# Patient Record
Sex: FEMALE | Race: WHITE | NOT HISPANIC OR LATINO | Employment: FULL TIME | URBAN - NONMETROPOLITAN AREA
[De-identification: names, ages, dates, MRNs, and addresses within clinical notes are randomized per-mention and may not be internally consistent; named-entity substitution may affect disease eponyms.]

---

## 2018-10-15 ENCOUNTER — THERAPY VISIT (OUTPATIENT)
Dept: CHIROPRACTIC MEDICINE | Facility: OTHER | Age: 33
End: 2018-10-15
Attending: CHIROPRACTOR
Payer: COMMERCIAL

## 2018-10-15 DIAGNOSIS — M99.03 SEGMENTAL AND SOMATIC DYSFUNCTION OF LUMBAR REGION: ICD-10-CM

## 2018-10-15 DIAGNOSIS — M99.02 SEGMENTAL AND SOMATIC DYSFUNCTION OF THORACIC REGION: ICD-10-CM

## 2018-10-15 DIAGNOSIS — M99.01 SEGMENTAL AND SOMATIC DYSFUNCTION OF CERVICAL REGION: Primary | ICD-10-CM

## 2018-10-15 DIAGNOSIS — M54.50 LUMBAGO: ICD-10-CM

## 2018-10-15 DIAGNOSIS — M54.2 DORSALGIA OF CERVICAL REGION: ICD-10-CM

## 2018-10-15 PROCEDURE — 99202 OFFICE O/P NEW SF 15 MIN: CPT | Mod: 25 | Performed by: CHIROPRACTOR

## 2018-10-15 PROCEDURE — 98941 CHIROPRACT MANJ 3-4 REGIONS: CPT | Mod: AT | Performed by: CHIROPRACTOR

## 2018-10-15 RX ORDER — ETONOGESTREL AND ETHINYL ESTRADIOL VAGINAL RING .015; .12 MG/D; MG/D
1 RING VAGINAL
COMMUNITY

## 2018-10-15 RX ORDER — DEXTROAMPHETAMINE SACCHARATE, AMPHETAMINE ASPARTATE MONOHYDRATE, DEXTROAMPHETAMINE SULFATE AND AMPHETAMINE SULFATE 2.5; 2.5; 2.5; 2.5 MG/1; MG/1; MG/1; MG/1
10 CAPSULE, EXTENDED RELEASE ORAL 3 TIMES DAILY
COMMUNITY

## 2018-10-15 NOTE — MR AVS SNAPSHOT
After Visit Summary   10/15/2018    Jay Jay Jacobsen    MRN: [id number]           Patient Information     Date Of Birth          1985        Visit Information        Provider Department      10/15/2018 8:00 AM Matt Retana DC [facility]        Today's Diagnoses     Segmental and somatic dysfunction of cervical region    -  1    Segmental and somatic dysfunction of thoracic region        Segmental and somatic dysfunction of lumbar region        Dorsalgia of cervical region        Lumbago          Care Instructions    Continue to use heat as needed. Use stretches and soft tissue mobilization as instructed.          Follow-ups after your visit        Follow-up notes from your care team     Return if symptoms worsen or fail to improve, for Routine Visit.      Who to contact     If you have questions or need follow up information about today's clinic visit or your schedule please contact [facility] directly at [phone].  Normal or non-critical lab and imaging results will be communicated to you by MyChart, letter or phone within 4 business days after the clinic has received the results. If you do not hear from us within 7 days, please contact the clinic through MyChart or phone. If you have a critical or abnormal lab result, we will notify you by phone as soon as possible.  Submit refill requests through [employer] or call your pharmacy and they will forward the refill request to us. Please allow 3 business days for your refill to be completed.          Additional Information About Your Visit        Care EveryWhere ID     This is your Care EveryWhere ID. This could be used by other organizations to access your [facility] medical records  [id number]         Blood Pressure from Last 3 Encounters:   No data found for BP    Weight from Last 3 Encounters:   No data found for Wt              We Performed the Following     CHIROPRAC MANIP,SPINAL,3-4 REGIONS         Primary Care Provider Office Phone # Fax #    Gisell Polanco, EBEN [phone]       [facility] [address]  [facility] [id number]        Equal Access to Services     JAIME GASTELUM : Hadii aad ku hadfabiano Solinseyali, wakenyada luqadaha, qaybta kaalmada adefernie, kimmie parkerin hayaaismael katzdat garciaalfonzo zavaleta. So [facility] [phone].    ATENCIÓN: Si habla español, tiene a stark disposición servicios gratuitos de asistencia lingüística. Llame al [phone].    We comply with applicable federal civil rights laws and [place] laws. We do not discriminate on the basis of race, color, national origin, age, disability, sex, sexual orientation, or gender identity.            Thank you!     Thank you for choosing [facility]  for your care. Our goal is always to provide you with excellent care. Hearing back from our patients is one way we can continue to improve our services. Please take a few minutes to complete the written survey that you may receive in the mail after your visit with us. Thank you!             Your Updated Medication List - Protect others around you: Learn how to safely use, store and throw away your medicines at www.disposemymeds.org.          This list is accurate as of 10/15/18 10:05 AM.  Always use your most recent med list.                   Brand Name Dispense Instructions for use Diagnosis    amphetamine-dextroamphetamine 10 MG per 24 hr capsule    ADDERALL XR     Take 10 mg by mouth 3 times daily        etonogestrel-ethinyl estradiol 0.12-0.015 MG/24HR vaginal ring    NUVARING     Place 1 each vaginally every 28 days

## 2018-10-15 NOTE — PROGRESS NOTES
"PATIENT:  Jay Jay Jacobsen is a 33 year old female presenting for neck/upper back pain and low back pain.    PROBLEM:   Date of Initial Visit for this Episode:  10/15/2018     Visit #1    SUBJECTIVE / HPI:   Description and onset:Patient presents with primary complaints of neck/upper back pain following running the [place] marathon recently. Patient describes pain as soreness. Patient denies any radiation of symptoms or recent traumas to the neck or upper back. Patient was evaluated by Dr. Phu Stephen D.C. In [place] recently and reported the adjustments \"didnt take,\" due to on going symptoms patient presents to our office for further evaluation and treatment.  Duration and Frequency of Pain: a little over a week and Occasionally 26-50%  Radiation of pain: no  Pain rated at it's worst: 3/10  Pain rated currently:  3/10  Pain course: Not changing  Worse with:  Static positioning  Improved by:  Ice, Heat and foam rolling and scapular stabilization exercises  Additional Features: none  Other Health Care Providers seen for this: Dr. Phu Stephen D.C.  Previous treatment: Chiropractic  Previous injury:Patient has been treated for similar complaints in the past.      Other Secondary/Associated Problems  Description, Duration and Location of Seondary Problem: Low back pain after [place] the [place]. Pain is described as sharp. No traumas are reported to the low back.    Duration and Frequency of Pain: A little over a week and 26-50%  Radiation of pain: No  Pain rated at it's worst: 3/10  Pain rated currently:  3/10  Pain course: unchanged  Worse with:  Static positioning  Improved by:  Heat and foam rolling  Additional Features: None  Other Health Care Providers seen for this: Dr. Phu Stephen D.C.  Previous treatment: Chiropractic  Previous injury:Patient has been treated before for similar complaints    See flowsheets in chart for details.  10/15/2018  Neck index 8%    Oswestry Back index 6%      Functional " limitations:  Static positioning for extended periods of time    Exercise habits: Pain is noticed but does not limit  Sleeping habits: unaffected    Past D.C. Care: yes, helpful       Health History as reported by the patient: Excellent and Good      PAST MEDICAL HISTORY:  Past Medical History:   Diagnosis Date     Attention deficit disorder with hyperactivity     No Comments Provided       PAST SURGICAL HISTORY:  Past Surgical History:   Procedure Laterality Date     MAMMOPLASTY AUGMENTATION      Augmentation mammaplasy w/ prosthesis       ALLERGIES:  Allergies not on file    CURRENT MEDICATIONS:  Current Outpatient Prescriptions   Medication Sig Dispense Refill     amphetamine-dextroamphetamine (ADDERALL XR) 10 MG per 24 hr capsule Take 10 mg by mouth 3 times daily       etonogestrel-ethinyl estradiol (NUVARING) 0.12-0.015 MG/24HR vaginal ring Place 1 each vaginally every 28 days         SOCIAL HISTORY:  Marital Status: .  Children: Unknown  Occupation: Works at [email or web address].  Alcohol use:none.  Tobacco use: Smoker: no.      FAMILY HISTORY:  Family History   Problem Relation Age of Onset     Other - See Comments Brother      Psychiatric illness       There is no problem list on file for this patient.        ROS:  The patient denies any fevers, chills, nausea, vomiting, diarrhea, constipation,dysuria, hematuria, or urinary hesitancy or incontinence.  No shortness of breath, chest pain, or rashes.    OBJECTIVE:    DIAGNOSTICS:  NO current spinal imaging taken.     PHYSICAL EXAM:     GENERAL APPEARANCE: healthy, alert, active, no distress, cooperative and smiling   GAIT: NORMAL    MUSCULOSKELETAL:   Posture: Good to fair.  There is moderate anterior head carriage with bilateral rounding of the shoulders mildly.  Right shoulder appears elevated compared to left as is the right iliac crest.  No posterior rotation of the head, shoulders, or pelvis is noted.  Gait:  unremarkable.     Cervical  ROM:   smooth/halting arc  "of motion   50/50 flexion    45/45 extension    45/45 RLF  45/45 LLF    85/85 RR         85/85 LR       -Maximal Foraminal Compression: no focal mild neck pain.    Shoulder Depression: no pain or stretching reported  -Distraction improves      Thoracic and Lumbar  ROM:  60/60 flexion 50/60 extension    35/45 RLF    45/45 LLF       +Kemps: bilaterally of the lumbar spine and over the mid thoracic spine  -Gillets:  - Straight leg raise  - ANA: No sacroiliac jt pain, no restricted ROM.   +Leg length inequality: R 1/4\" [place] -;   Other:  -Ely's. -Nachlas    +Tenderness: Palpatory tenderness is noted of the right lower cervical spine between C4 through C6, point tenderness is noted at T7 midline, operatory tenderness is elicited of L5 on the left  +Muscle spasm: Mild/moderate spasms are noted of the right cervical paraspinals of the lower cervical spine.  Mild spasm noted of the left quadratus lumborum.  There are taut tender fibers of the T-spine paraspinals bilaterally between T5 through T8 and of the lumbar paraspinals on the left.  Taut tender fibers are noted of the gluteus medius bilaterally however left is predominant and of the TFL bilaterally.  +Joint asymmetry and restriction: C5 with extension right lateral flexion and left rotation.  T7 with extension, L5 with extension and right rotation.    ASSESSMENT: Jay Jay Jacobsen is a 33 year old female with neck/upper back pain and low back pain. Patient shows subjective and objective signs of segmental and somatic dysfunction of the cervical, thoracic and lumbar spine. Patient is believed to be a good candidate for chiropractic adjustments with home exercises. Patient is very active in the [employer] and has good body awareness and good knowledge of exercises to be used effectively. Periodic adjustments will also be beneficial when home therapies are not improving patient's symptoms.     1. Segmental and somatic dysfunction of cervical region    2. " Segmental and somatic dysfunction of thoracic region    3. Segmental and somatic dysfunction of lumbar region    4. Dorsalgia of cervical region    5. Lumbago        PLAN    Evaluation and Management:  95186 Low to moderate level exam 20 min    Procedures:  Modalities:  None performed this visit    CMT:  08213 Chiropractic manipulative treatment 3-4 regions performed   Cervical: Diversified, C5 , Supine  Thoracic: Diversified, T7, Prone  Lumbar: Diversified, L5, Side posture    Therapeutic procedures:  The following were demonstrated and practiced: Psoas stretches and soft tissue mobilization. TFL soft tissue mobilization and gluteus medius soft tissue mobilization.    Response to Treatment  Reduction in symptoms as reported by patient    Prognosis: Good    10/15/2018 Plan of Care:  1-4 visits of Chiropractic Care including Spinal Adjustments and/or physiotherapy and active rehabilitation, to include exercises in the office and/or at home to meet care plan goals.     Frequency: As needed for up to 4 weeks. A reevaluation would be clinically appropriate in 4 visits, to determine progress and further course of care. Plan not to exceed 30 days.    POC discussed and patient agreeable to plan of care.      10/15/2018 Goals:      Patient will report improved pain of the neck/upper back.   Patient will report less pain in the low back     Patient will demonstrate effective use of home exercises      INSTRUCTIONS   Continue to use heat as needed. Use stretches and soft tissue mobilization as instructed.    Follow-up:  Continue treatment PRN.        Disclaimer: This note consists of symbols derived from keyboarding, dictation and/or voice recognition software. As a result, there may be errors in the script that have gone undetected. Please consider this when interpreting information found in this chart.

## 2018-11-27 ENCOUNTER — HEALTH MAINTENANCE LETTER (OUTPATIENT)
Age: 33
End: 2018-11-27

## 2018-12-27 ENCOUNTER — THERAPY VISIT (OUTPATIENT)
Dept: CHIROPRACTIC MEDICINE | Facility: OTHER | Age: 33
End: 2018-12-27
Attending: CHIROPRACTOR
Payer: COMMERCIAL

## 2018-12-27 DIAGNOSIS — M54.2 DORSALGIA OF CERVICAL REGION: ICD-10-CM

## 2018-12-27 DIAGNOSIS — M99.02 SEGMENTAL AND SOMATIC DYSFUNCTION OF THORACIC REGION: ICD-10-CM

## 2018-12-27 DIAGNOSIS — M99.01 SEGMENTAL AND SOMATIC DYSFUNCTION OF CERVICAL REGION: Primary | ICD-10-CM

## 2018-12-27 PROCEDURE — 98940 CHIROPRACT MANJ 1-2 REGIONS: CPT | Mod: AT | Performed by: CHIROPRACTOR

## 2018-12-27 NOTE — PROGRESS NOTES
Visit #:  2    Subjective:  Jay Jay Jacobsen is a 33 year old female who is seen in f/u up for:        Segmental and somatic dysfunction of cervical region  Segmental and somatic dysfunction of thoracic region  Dorsalgia of cervical region.     Since last visit on 10/15/2018,  Jay Jay Jacobsen reports:    Area of chief complaint:  Patient presents with primary complaints of neck pain.  Currently pain is ranked at 2 out of 10 on a pain scale.  Patient qualifies her symptoms as tight feeling.  Patient reports that she has begun experiencing occasional headaches.  To help manage her symptoms patient has been performing home stretches and soft tissue mobilization as well as utilizing heat.  Patient states that when this no longer provided lasting relief as well as headache symptoms began patient contacted our office for further evaluation and treatment.  Patient denies any recent traumatic events which may have aggravated her symptoms.  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  Patient notes occasionally experiencing loss of range of motion as well as mild pain with rotation and lateral flexion of the cervical spine.  This happens infrequently.       Objective:  The following was observed:  Oswestry (JEANETTE) Questionnaire    OSWESTRY DISABILITY INDEX 12/27/2018   Count 10   Sum 0   Oswestry Score (%) 0   Some recent data might be hidden      Neck disability index performed which showed a score of 10%, this is mildly increased from 8% when last evaluated      P: Palpatory tenderness is minimal and located of the suboccipital region as well as the CT junction.  A: static palpation demonstrates intersegmental asymmetry , cervical, thoracic  R: motion palpation notes restricted motion, C1 , C2 , T3  and T7   T: Mild spasms are present of the suboccipitals bilaterally, mild spasms present of the upper trapezius  as well as T-spine paraspinals of the intrascapular region to approximately T8    Segmental spinal dysfunction/restrictions found at:  :  C1 Left rotation restricted and Right lateral flexion restricted  C2 Right rotation restricted and Extension restriction  T3 Extension restriction  T7 Extension restriction.      Assessment: Patient appears to have experienced mild exacerbation of symptoms.  Likely causes include but are not limited to recently finishing a semester of teaching which could indicate increased amount of stress as well as improper posture due to extended periods of time spent grading tests as well as sitting looking at a computer screen.  Patient has been under our care with similar complaints in the past and has been very compliant with home stretches as well as maintaining an active lifestyle.  Patient expected to respond quite favorably to care today.    Diagnoses:      1. Segmental and somatic dysfunction of cervical region    2. Segmental and somatic dysfunction of thoracic region    3. Dorsalgia of cervical region        Patient's condition:  Patient had restrictions pre-manipulation    Treatment effectiveness:  Post manipulation there is better intersegmental movement and Patient claims to feel looser post manipulation      Procedures:  CMT:  69486 Chiropractic manipulative treatment 1-2 regions performed   Cervical: Diversified, C1 , C2, Supine  Thoracic: Diversified, T3, T7, Prone    Modalities:  None performed this visit    Therapeutic procedures:  None    Response to Treatment  Reduction in symptoms as reported by patient    Prognosis: Excellent     Goal: Reduce headache symptoms  Reduce neck pain     Recommendations:    Instructions:heat 15 minutes every other hour as needed and stretch as instructed at visit    Follow-up:  Return to care if symptoms persist.

## 2020-03-11 ENCOUNTER — HEALTH MAINTENANCE LETTER (OUTPATIENT)
Age: 35
End: 2020-03-11

## 2020-11-13 ENCOUNTER — ALLIED HEALTH/NURSE VISIT (OUTPATIENT)
Dept: FAMILY MEDICINE | Facility: OTHER | Age: 35
End: 2020-11-13
Payer: COMMERCIAL

## 2020-11-13 DIAGNOSIS — J02.9 SORE THROAT: Primary | ICD-10-CM

## 2020-11-13 PROCEDURE — U0003 INFECTIOUS AGENT DETECTION BY NUCLEIC ACID (DNA OR RNA); SEVERE ACUTE RESPIRATORY SYNDROME CORONAVIRUS 2 (SARS-COV-2) (CORONAVIRUS DISEASE [COVID-19]), AMPLIFIED PROBE TECHNIQUE, MAKING USE OF HIGH THROUGHPUT TECHNOLOGIES AS DESCRIBED BY CMS-2020-01-R: HCPCS | Mod: ZL

## 2020-11-13 PROCEDURE — 99207 PR NO CHARGE NURSE ONLY: CPT

## 2020-11-13 PROCEDURE — C9803 HOPD COVID-19 SPEC COLLECT: HCPCS

## 2020-11-15 LAB
SARS-COV-2 RNA SPEC QL NAA+PROBE: NOT DETECTED
SPECIMEN SOURCE: NORMAL

## 2020-12-27 ENCOUNTER — HEALTH MAINTENANCE LETTER (OUTPATIENT)
Age: 35
End: 2020-12-27

## 2021-04-25 ENCOUNTER — HEALTH MAINTENANCE LETTER (OUTPATIENT)
Age: 36
End: 2021-04-25

## 2021-06-28 ENCOUNTER — IMMUNIZATION (OUTPATIENT)
Dept: FAMILY MEDICINE | Facility: OTHER | Age: 36
End: 2021-06-28
Attending: FAMILY MEDICINE
Payer: COMMERCIAL

## 2021-06-28 PROCEDURE — 0001A PR COVID VAC PFIZER DIL RECON 30 MCG/0.3 ML IM: CPT

## 2021-06-28 PROCEDURE — 91300 PR COVID VAC PFIZER DIL RECON 30 MCG/0.3 ML IM: CPT

## 2021-10-09 ENCOUNTER — HEALTH MAINTENANCE LETTER (OUTPATIENT)
Age: 36
End: 2021-10-09

## 2021-10-29 ENCOUNTER — THERAPY VISIT (OUTPATIENT)
Dept: CHIROPRACTIC MEDICINE | Facility: OTHER | Age: 36
End: 2021-10-29
Attending: CHIROPRACTOR
Payer: COMMERCIAL

## 2021-10-29 VITALS
DIASTOLIC BLOOD PRESSURE: 70 MMHG | TEMPERATURE: 98.4 F | RESPIRATION RATE: 16 BRPM | HEART RATE: 84 BPM | OXYGEN SATURATION: 97 % | SYSTOLIC BLOOD PRESSURE: 124 MMHG

## 2021-10-29 DIAGNOSIS — M99.02 SEGMENTAL AND SOMATIC DYSFUNCTION OF THORACIC REGION: ICD-10-CM

## 2021-10-29 DIAGNOSIS — M99.01 SEGMENTAL AND SOMATIC DYSFUNCTION OF CERVICAL REGION: ICD-10-CM

## 2021-10-29 DIAGNOSIS — M99.04 SEGMENTAL AND SOMATIC DYSFUNCTION OF SACRAL REGION: Primary | ICD-10-CM

## 2021-10-29 DIAGNOSIS — M54.50 LEFT-SIDED LOW BACK PAIN WITHOUT SCIATICA, UNSPECIFIED CHRONICITY: ICD-10-CM

## 2021-10-29 DIAGNOSIS — M54.6 PAIN IN THORACIC SPINE: ICD-10-CM

## 2021-10-29 DIAGNOSIS — M54.2 CERVICALGIA: ICD-10-CM

## 2021-10-29 PROCEDURE — 98941 CHIROPRACT MANJ 3-4 REGIONS: CPT | Mod: AT | Performed by: CHIROPRACTOR

## 2021-10-29 PROCEDURE — 99212 OFFICE O/P EST SF 10 MIN: CPT | Mod: 25 | Performed by: CHIROPRACTOR

## 2021-10-29 NOTE — PROGRESS NOTES
Visit #:  1/6-12  New Episode 10/29/21    Subjective:  Jay Jay Jacobsen is a 36 year old female who is seen in f/u up for:        Segmental and somatic dysfunction of sacral region  Left-sided low back pain without sciatica, unspecified chronicity  Segmental and somatic dysfunction of thoracic region  Pain in thoracic spine  Segmental and somatic dysfunction of cervical region  Cervicalgia.     Since last visit on Visit date not found,  Jay Jay Jacobsen reports: Has been experiencing increasing levels of neck, upper and lower left back pain.  No specific traumatic events noted.  Patient states that she is always somewhat sore but notes that it has been getting increasingly more noticeable.  Has been trying to manage symptoms with heat and stretching.  Provide some level of relief.  No reported lower extremity radicular symptoms.  Occasional numbness noted of the hands, does not seem to have worsened since aggravation of neck and back symptoms.  No reported loss of bowel or bladder.  Patient is experiencing nausea and upset stomach due to current pregnancy.    Currently 10 weeks pregnant.    Area of chief complaint:  Lumbar :  Symptoms are graded at 0-5/10. The quality is described as sharp, intermittently.    Thoracic :  Symptoms are graded at 5/10. The quality is described as constantly achey and tight.    Cervical :  Symptoms are graded at 5/10. The quality is described as achey, and tight constantly.       Objective:  The following was observed:/70 (BP Location: Right arm)   Pulse 84   Temp 98.4  F (36.9  C) (Tympanic)   Resp 16   SpO2 97%   Neck Disability Index (  [employer]. [employer] C. 1991. All rights reserved.; used with permission) 10/29/2021   SECTION 1 - PAIN INTENSITY 1   SECTION 2 - PERSONAL CARE 0   SECTION 3 - LIFTING 0   SECTION 4 - READING 0   SECTION 5 - HEADACHES 0   SECTION 6 - CONCENTRATION 0   SECTION 7 - WORK 0   SECTION 8 - DRIVING 0   SECTION 9 - SLEEPING 0   SECTION 10 -  RECREATION 0   Count 10   Sum 1   Raw Score: /50 1   Neck Disability Index Score: (%) 2     Cervical AROM: generally unremarkable    Cervical Compression: -local neck pain, - radicular symptoms  Cervical Distraction: -  Oswestry (JEANETTE) Questionnaire    OSWESTRY DISABILITY INDEX 10/29/2021   Count 9   Sum 1   Oswestry Score (%) 2.22   Some recent data might be hidden        Thoracic/Lumbar AROM: unremarkable    Iliac compression: +left  Ely's: - right, - left    P: palpatory tendernessC2 left, C6 right, T2 left, T6 midline, T10 left, left PSIS:    A: static palpation demonstrates intersegmental asymmetry , cervical, thoracic, pelvis  R: motion palpation notes restricted motion, C2 , C6 , T2 , T6 , T10 and Sacrum   T: Taut tender fibers throughout paraspinal musculature cervical, thoracic, lumbar regions, left quadratus lumborum, upper trapezius and rhomboids bilaterally    Segmental spinal dysfunction/restrictions found at:  :  C2 Left lateral flexion restricted and Extension restriction  C6 Right lateral flexion restricted and Extension restriction  T6 Extension restriction  T10 Left lateral flexion restricted and Extension restriction  Sacrum Left lateral flexion restricted and Extension restriction.      Assessment: Patient presents primary complaints of neck and back pain.  There is evidence of segmental/somatic dysfunction of the cervical, thoracic, pelvic spinal regions on today's visit consistent with patient's symptoms.  Patient has been under our care with chiropractic complaints in the past and has responded favorably to course of treatment.  Due to Covid patient has not been able to see her massage therapist.  Curious if any massage therapist in the area have been vaccinated.  Unsure about this however we will provide inquiries to local resources to determine if this is possible for the patient.  At this time plan for 6-12 visits within the next 4-6 weeks.  Due to patient's current pregnancy anticipate that  symptoms likely will be slower to progress primarily of the low back.  Discussed possibility of utilizing physical therapy, SI support to help manage symptoms during this time.    Diagnoses:      1. Segmental and somatic dysfunction of sacral region    2. Left-sided low back pain without sciatica, unspecified chronicity    3. Segmental and somatic dysfunction of thoracic region    4. Pain in thoracic spine    5. Segmental and somatic dysfunction of cervical region    6. Cervicalgia        Patient's condition:  Patient had restrictions pre-manipulation    Treatment effectiveness:  Post manipulation there is better intersegmental movement and Patient claims to feel looser post manipulation      Procedures:  E/M 55076 Established patient    CMT:  51947 Chiropractic manipulative treatment 3-4 regions performed   Cervical: Diversified, C2, C6, Supine  Thoracic: Diversified, T2, T6, T10, Prone  Pelvis: Diversified, Sacrum , Side posture    Modalities:  None performed this visit    Therapeutic procedures:  Elsa Sweeney PT low back tweak fixes protocol-resisted knee adduction, resisted knee abduction, resisted nutation/counternutation    Response to Treatment  Reduction in symptoms as reported by patient    Prognosis: Good    Goals:Reduce pain by 35-50%  Decrease positive ortho-neuro tests   Patient will demonstrate effective use of home management strategies  Recommendations:    Instructions:Perform home exercises as discussed    Follow-up:  Return to care in 1 week.

## 2021-11-04 ENCOUNTER — THERAPY VISIT (OUTPATIENT)
Dept: CHIROPRACTIC MEDICINE | Facility: OTHER | Age: 36
End: 2021-11-04
Attending: CHIROPRACTOR
Payer: COMMERCIAL

## 2021-11-04 VITALS
OXYGEN SATURATION: 98 % | HEART RATE: 78 BPM | DIASTOLIC BLOOD PRESSURE: 72 MMHG | RESPIRATION RATE: 16 BRPM | SYSTOLIC BLOOD PRESSURE: 122 MMHG | TEMPERATURE: 97.3 F

## 2021-11-04 DIAGNOSIS — M99.04 SEGMENTAL AND SOMATIC DYSFUNCTION OF SACRAL REGION: Primary | ICD-10-CM

## 2021-11-04 DIAGNOSIS — M54.2 CERVICALGIA: ICD-10-CM

## 2021-11-04 DIAGNOSIS — M54.50 LEFT-SIDED LOW BACK PAIN WITHOUT SCIATICA, UNSPECIFIED CHRONICITY: ICD-10-CM

## 2021-11-04 DIAGNOSIS — M99.01 SEGMENTAL AND SOMATIC DYSFUNCTION OF CERVICAL REGION: ICD-10-CM

## 2021-11-04 DIAGNOSIS — M99.02 SEGMENTAL AND SOMATIC DYSFUNCTION OF THORACIC REGION: ICD-10-CM

## 2021-11-04 DIAGNOSIS — M54.6 PAIN IN THORACIC SPINE: ICD-10-CM

## 2021-11-04 PROCEDURE — 98941 CHIROPRACT MANJ 3-4 REGIONS: CPT | Mod: AT | Performed by: CHIROPRACTOR

## 2021-11-04 NOTE — PROGRESS NOTES
"Visit #:  2/6-12    Subjective:  Jay Jay Jacobsen is a 36 year old female who is seen in f/u up for:        Segmental and somatic dysfunction of sacral region  Left-sided low back pain without sciatica, unspecified chronicity  Segmental and somatic dysfunction of thoracic region  Pain in thoracic spine  Segmental and somatic dysfunction of cervical region  Cervicalgia.     Since last visit on 10/29/2021,  Jay Jay Jacobsen reports: Symptoms showing some improvement at this time.  Feels like \"things have moved back\" which the patient states that she feels like things are beginning to tighten up again after last visit.  Also has been noticing increasing levels of numbness of her hands recently.  States that overall she does notice improvement of symptoms which she is very pleased about at this time.    Patient currently 11 weeks pregnant.    Area of chief complaint:  Cervical and Thoracic :  Symptoms are graded at 3/10. The quality is described as constantly tight.   Lumbar :  Symptoms are graded at 3/10. The quality is described as constantly tight.        Objective:  The following was observed:/72 (BP Location: Right arm, Patient Position: Sitting)   Pulse 78   Temp 97.3  F (36.3  C) (Tympanic)   Resp 16   SpO2 98%     P: palpatory tenderness C2 right, C6 left, T10 midline, left PSIS:    A: static palpation demonstrates intersegmental asymmetry , cervical, thoracic, pelvis  R: motion palpation notes restricted motion, C2 , C6 , T4 , T10 and Sacrum   T: muscle spasm at level(s): Mild spasms throughout paraspinal musculature thoracic into lumbar region, left quadratus lumborum, upper trapezius bilaterally:      Segmental spinal dysfunction/restrictions found at:  :  C2 Left rotation restricted, Right lateral flexion restricted and Extension restriction  C6 Left lateral flexion restricted and Extension restriction  T4 Extension restriction  T10 Extension restriction  Sacrum Left lateral flexion " restricted and Extension restriction.      Assessment: Patient showing good signs of progress.  Continue once a week care at this time.  On last patient encounter patient was curious about massage therapists in the region that were vaccinated.  Unaware of any massage therapist at this time however we will continue to search for resources for patient.    Diagnoses:      1. Segmental and somatic dysfunction of sacral region    2. Left-sided low back pain without sciatica, unspecified chronicity    3. Segmental and somatic dysfunction of thoracic region    4. Pain in thoracic spine    5. Segmental and somatic dysfunction of cervical region    6. Cervicalgia        Patient's condition:  Patient had restrictions pre-manipulation and Patient symptoms are gradually improving    Treatment effectiveness:  Post manipulation there is better intersegmental movement, Patient states that they feel much better post manipulation but they gradually tighten up over time, Symptoms appear to be decreasing and Tenderness is decreasing      Procedures:  CMT:  78024 Chiropractic manipulative treatment 3-4 regions performed   Cervical: Diversified, C2, C6, Supine  Thoracic: Diversified, T4, T10, Prone  Pelvis: Diversified, Sacrum , Side posture    Modalities:  None performed this visit    Therapeutic procedures:  None    Response to Treatment  Reduction in symptoms as reported by patient    Prognosis: Good    Progress towards Goals: Patient is making progress towards the goal.    Reduce pain by 35-50%   Decrease positive ortho-neuro tests      Patient will demonstrate effective use of home management strategies    Recommendations:    Instructions:ice 20 minutes every other hour as needed and heat 15 minutes every other hour as needed    Follow-up:  Return to care in 1 week.

## 2021-11-11 ENCOUNTER — THERAPY VISIT (OUTPATIENT)
Dept: CHIROPRACTIC MEDICINE | Facility: OTHER | Age: 36
End: 2021-11-11
Attending: CHIROPRACTOR
Payer: COMMERCIAL

## 2021-11-11 VITALS
OXYGEN SATURATION: 97 % | SYSTOLIC BLOOD PRESSURE: 126 MMHG | TEMPERATURE: 98.5 F | HEART RATE: 72 BPM | DIASTOLIC BLOOD PRESSURE: 78 MMHG | RESPIRATION RATE: 16 BRPM

## 2021-11-11 DIAGNOSIS — M99.02 SEGMENTAL AND SOMATIC DYSFUNCTION OF THORACIC REGION: ICD-10-CM

## 2021-11-11 DIAGNOSIS — M54.50 LEFT-SIDED LOW BACK PAIN WITHOUT SCIATICA, UNSPECIFIED CHRONICITY: ICD-10-CM

## 2021-11-11 DIAGNOSIS — M99.01 SEGMENTAL AND SOMATIC DYSFUNCTION OF CERVICAL REGION: ICD-10-CM

## 2021-11-11 DIAGNOSIS — M99.04 SEGMENTAL AND SOMATIC DYSFUNCTION OF SACRAL REGION: Primary | ICD-10-CM

## 2021-11-11 DIAGNOSIS — M54.2 CERVICALGIA: ICD-10-CM

## 2021-11-11 DIAGNOSIS — M54.6 PAIN IN THORACIC SPINE: ICD-10-CM

## 2021-11-11 PROCEDURE — 98941 CHIROPRACT MANJ 3-4 REGIONS: CPT | Mod: AT | Performed by: CHIROPRACTOR

## 2021-11-11 NOTE — PROGRESS NOTES
Upper back bilateral constant sore and tight. Rating 4/10 W24 4/10. Lower back and hip left side constant sore and tight rating 4/10 W24 4/10. Neck constant sore and tight 4/10 W24 4/10.   Matt Retana DC on 11/11/2021 at 8:38 AM    Visit #:  3/6-12    Subjective:  Jay Jay Jacobsen is a 36 year old female who is seen in f/u up for:        Segmental and somatic dysfunction of sacral region  Left-sided low back pain without sciatica, unspecified chronicity  Segmental and somatic dysfunction of thoracic region  Pain in thoracic spine  Segmental and somatic dysfunction of cervical region  Cervicalgia.     Since last visit on 11/4/2021,  Jay Jay Jacobsen reports: Symptoms showed some improvement since last visit however they are mildly flared up at this time.  Patient was out deer hunting this weekend which seems to may have contributed to return of symptoms.    Patient provided massage therapist in the [place] area who is vaccinated to help with ongoing symptoms.  Patient also interested in possibly pursuing pregnancy yoga to help with ongoing neck and back problems.    Area of chief complaint:  Lumbar :  Symptoms are graded at 4/10. The quality is described as constantly sore and tight.    Thoracic :  Symptoms are graded at 4/10. The quality is described as constantly sore and tight.    Cervical :  Symptoms are graded at 4/10. The quality is described as constantly sore and tight.       Objective:  The following was observed:  /78 (BP Location: Right arm, Patient Position: Sitting)   Pulse 72   Temp 98.5  F (36.9  C) (Tympanic)   Resp 16   SpO2 97%      P: palpatory tenderness Right side C2, left side C6, T4 midline, T12 on left, PSIS bilaterally:    A: static palpation demonstrates intersegmental asymmetry , cervical, thoracic, pelvis  R: motion palpation notes restricted motion, C2 , C6 , T4 , T10 and Sacrum   T: muscle spasm at level(s): Left quadratus lumborum, rhomboids and upper trapezius  bilaterally, mild spasms cervical paraspinal musculature bilaterally:      Segmental spinal dysfunction/restrictions found at:  :  C2 Left rotation restricted, Right lateral flexion restricted and Extension restriction  C6 Right rotation restricted, Left lateral flexion restricted and Extension restriction  T4 Extension restriction  T12 Extension restriction  Sacrum Extension restriction.      Assessment: Patient does appear to be showing some signs of progress at this time.  On next patient visit work more on home exercise strategies to help manage symptoms.  Patient was provided with massage therapist in the [place] area was vaccinated to help with ongoing symptoms.    Diagnoses:      1. Segmental and somatic dysfunction of sacral region    2. Left-sided low back pain without sciatica, unspecified chronicity    3. Segmental and somatic dysfunction of thoracic region    4. Pain in thoracic spine    5. Segmental and somatic dysfunction of cervical region    6. Cervicalgia        Patient's condition:  Patient had restrictions pre-manipulation and Symptoms come and go    Treatment effectiveness:  Post manipulation there is better intersegmental movement and Patient states that they feel much better post manipulation but they gradually tighten up over time      Procedures:  CMT:  26806 Chiropractic manipulative treatment 3-4 regions performed   Cervical: Diversified, C2, C6, Supine  Thoracic: Diversified, T4, T12, Prone  Pelvis: Diversified, Sacrum , Side posture    Modalities:  None performed this visit    Therapeutic procedures:  None    Response to Treatment  Reduction in symptoms as reported by patient    Prognosis: Good    Progress towards Goals: Patient is making progress towards the goal.      Reduce pain by 35-50%              Decrease positive ortho-neuro tests                 Patient will demonstrate effective use of home management strategies    Recommendations:    Instructions:contact massage  therapist    Follow-up:  Return to care in 1 week.

## 2021-12-01 ENCOUNTER — THERAPY VISIT (OUTPATIENT)
Dept: CHIROPRACTIC MEDICINE | Facility: OTHER | Age: 36
End: 2021-12-01
Attending: CHIROPRACTOR
Payer: COMMERCIAL

## 2021-12-01 VITALS
HEART RATE: 76 BPM | DIASTOLIC BLOOD PRESSURE: 72 MMHG | SYSTOLIC BLOOD PRESSURE: 132 MMHG | RESPIRATION RATE: 16 BRPM | TEMPERATURE: 97.5 F | OXYGEN SATURATION: 98 %

## 2021-12-01 DIAGNOSIS — M54.6 PAIN IN THORACIC SPINE: ICD-10-CM

## 2021-12-01 DIAGNOSIS — M54.50 LEFT-SIDED LOW BACK PAIN WITHOUT SCIATICA, UNSPECIFIED CHRONICITY: ICD-10-CM

## 2021-12-01 DIAGNOSIS — M54.2 CERVICALGIA: ICD-10-CM

## 2021-12-01 DIAGNOSIS — M99.02 SEGMENTAL AND SOMATIC DYSFUNCTION OF THORACIC REGION: Primary | ICD-10-CM

## 2021-12-01 DIAGNOSIS — M99.01 SEGMENTAL AND SOMATIC DYSFUNCTION OF CERVICAL REGION: ICD-10-CM

## 2021-12-01 DIAGNOSIS — M99.04 SEGMENTAL AND SOMATIC DYSFUNCTION OF SACRAL REGION: ICD-10-CM

## 2021-12-01 PROCEDURE — 98941 CHIROPRACT MANJ 3-4 REGIONS: CPT | Mod: AT | Performed by: CHIROPRACTOR

## 2021-12-01 NOTE — PROGRESS NOTES
Upper left side back constant pinching. 5/10 W24 5/10. Has tried heat with no change in pain. Bilateral lower back constant dull ache. 2/10 W24 2/10. Neck constant dull ache. 3/10 W24 3/10.   Matt Retana DC on 12/1/2021 at 8:45 AM    Visit #:  4/6-12    Subjective:  Jay Jay Jacobsen is a 36 year old female who is seen in f/u up for:        Segmental and somatic dysfunction of thoracic region  Pain in thoracic spine  Segmental and somatic dysfunction of cervical region  Cervicalgia  Segmental and somatic dysfunction of sacral region  Left-sided low back pain without sciatica, unspecified chronicity.     Since last visit on 11/11/2021,  Jay Jay Jacobsen reports: Symptoms showing some improvement since last visit.  States that she was able to visit with massage therapist recommended by our office with beneficial results.  Earlier today patient was helping to move something in the garage when she felt something slip out of position along the left mid back around the scapula.    Has been experiencing occasional numbness and tingling of the hands bilaterally.  No aggravating factors or triggering activities associated with this.  No reported weakness of the upper extremities.    Area of chief complaint:  Thoracic :  Symptoms are graded at 5/10. The quality is described as constant pinching.    Cervical :  Symptoms are graded at 3/10. The quality is described as constant dull ache.    Lumbar :  Symptoms are graded at 2/10. The quality is described as a constant dull ache.       Objective:  The following was observed:  /72 (BP Location: Right arm, Patient Position: Sitting)   Pulse 76   Temp 97.5  F (36.4  C) (Tympanic)   Resp 16   SpO2 98%    Neck Disability Index (  [employer]. 1991. All rights reserved.; used with permission) 12/1/2021   SECTION 1 - PAIN INTENSITY 1   SECTION 2 - PERSONAL CARE 0   SECTION 3 - LIFTING 0   SECTION 4 - READING 0   SECTION 5 - HEADACHES 0   SECTION 6 -  CONCENTRATION 0   SECTION 7 - WORK 0   SECTION 8 - DRIVING 0   SECTION 9 - SLEEPING 0   SECTION 10 - RECREATION 0   Count 10   Sum 1   Raw Score: /50 1   Neck Disability Index Score: (%) 2     Oswestry (JEANETTE) Questionnaire    OSWESTRY DISABILITY INDEX 12/1/2021   Count 9   Sum 3   Oswestry Score (%) 6.67   Some recent data might be hidden        P: palpatory tendernessLeft side T6, left CT junction, right side C1, left PSIS:    A: static palpation demonstrates intersegmental asymmetry , cervical, thoracic, pelvis  R: motion palpation notes restricted motion, C2 , C7 , T2  and T6 Sacrum  T: muscle spasm at level(s): Mild left upper trapezius, left rhomboid, cervical paraspinal musculature and right suboccipitals, left quadratus lumborum mild:      Segmental spinal dysfunction/restrictions found at:  :  C1 Left rotation restricted and Right lateral flexion restricted  C7 Left lateral flexion restricted and Extension restriction  T2 Left lateral flexion restricted and Extension restriction  T6 Extension restriction  Sacrum Left lateral flexion restricted and Extension restriction.      Assessment: Neck and low back symptoms are showing some improvement at this time.  Mild acute aggravation of upper thoracic symptoms.  Recommend following up with patient again next week.  Recommended nerve glides to help with numbness and tingling of patient's hands.  Continue to monitor this symptom    Diagnoses:      1. Segmental and somatic dysfunction of thoracic region    2. Pain in thoracic spine    3. Segmental and somatic dysfunction of cervical region    4. Cervicalgia    5. Segmental and somatic dysfunction of sacral region    6. Left-sided low back pain without sciatica, unspecified chronicity        Patient's condition:  Patient had restrictions pre-manipulation    Treatment effectiveness:  Post manipulation there is better intersegmental movement and Patient claims to feel looser post  manipulation      Procedures:  CMT:  68388 Chiropractic manipulative treatment 3-4 regions performed   Cervical: Diversified, C1 , C7 , Supine  Thoracic: Diversified, T2, T6, Prone  Pelvis: Diversified, Sacrum , Side posture    Modalities:  None performed this visit    Therapeutic procedures:  Nerve glide stretch  Child's pose stretch encouraged    Response to Treatment  Reduction in symptoms as reported by patient    Prognosis: Good    Progress towards Goals: Patient is making progress towards the goal.    Reduce pain by 35-50%              Decrease positive ortho-neuro tests                 Patient will demonstrate effective use of home management strategies    Recommendations:    Instructions:Stretch as necessary.  Monitor symptoms and perform activities as tolerated    Follow-up:  Return to care in 1 week.

## 2021-12-09 ENCOUNTER — THERAPY VISIT (OUTPATIENT)
Dept: CHIROPRACTIC MEDICINE | Facility: OTHER | Age: 36
End: 2021-12-09
Attending: CHIROPRACTOR
Payer: COMMERCIAL

## 2021-12-09 VITALS
HEART RATE: 80 BPM | RESPIRATION RATE: 16 BRPM | TEMPERATURE: 97.9 F | DIASTOLIC BLOOD PRESSURE: 72 MMHG | OXYGEN SATURATION: 97 % | SYSTOLIC BLOOD PRESSURE: 126 MMHG

## 2021-12-09 DIAGNOSIS — M99.01 SEGMENTAL AND SOMATIC DYSFUNCTION OF CERVICAL REGION: ICD-10-CM

## 2021-12-09 DIAGNOSIS — G57.02 PIRIFORMIS SYNDROME, LEFT: ICD-10-CM

## 2021-12-09 DIAGNOSIS — M54.2 CERVICALGIA: ICD-10-CM

## 2021-12-09 DIAGNOSIS — M54.6 PAIN IN THORACIC SPINE: ICD-10-CM

## 2021-12-09 DIAGNOSIS — M99.04 SEGMENTAL AND SOMATIC DYSFUNCTION OF SACRAL REGION: ICD-10-CM

## 2021-12-09 DIAGNOSIS — M99.02 SEGMENTAL AND SOMATIC DYSFUNCTION OF THORACIC REGION: Primary | ICD-10-CM

## 2021-12-09 DIAGNOSIS — G54.0 THORACIC OUTLET SYNDROME: ICD-10-CM

## 2021-12-09 DIAGNOSIS — M99.05 SEGMENTAL AND SOMATIC DYSFUNCTION OF PELVIC REGION: ICD-10-CM

## 2021-12-09 DIAGNOSIS — G57.01 PIRIFORMIS SYNDROME, RIGHT: ICD-10-CM

## 2021-12-09 DIAGNOSIS — M54.42 BILATERAL LOW BACK PAIN WITH BILATERAL SCIATICA, UNSPECIFIED CHRONICITY: ICD-10-CM

## 2021-12-09 DIAGNOSIS — M54.41 BILATERAL LOW BACK PAIN WITH BILATERAL SCIATICA, UNSPECIFIED CHRONICITY: ICD-10-CM

## 2021-12-09 PROCEDURE — 98941 CHIROPRACT MANJ 3-4 REGIONS: CPT | Mod: AT | Performed by: CHIROPRACTOR

## 2021-12-09 NOTE — PROGRESS NOTES
Bilateral upper back constant dull and sore. 2/10 W24 3/10. Has been using heat with no change in pain. Left side lower back and hip is constant dull with some pinching. Radiating down both legs and feet. These feel numb. 4/10 W24 4/10. Has been using heat and tried repositioning. No change in pain. Neck constant tightness. Radiating down both arms and hands. They feel numb.4/10 W24 4/10. Using heat and this is decreasing pain in neck.  Marie Calix on 12/9/2021 at 8:41 AM    Visit #:  5/6-12    Subjective:  Jay Jay Jacobsen is a 36 year old female who is seen in f/u up for:        Segmental and somatic dysfunction of thoracic region  Segmental and somatic dysfunction of cervical region  Thoracic outlet syndrome  Segmental and somatic dysfunction of sacral region  Segmental and somatic dysfunction of pelvic region  Piriformis syndrome, left  Piriformis syndrome, right  Cervicalgia  Pain in thoracic spine  Bilateral low back pain with bilateral sciatica, unspecified chronicity.     Since last visit on 12/1/2021,  Jay Jay Jacobsen reports: Symptoms slightly more agitated on today's visit primarily of the neck and lower back.  States that she has been noticing more constant numbness and tingling of the hands and feet.  Has been trying nerve glides as recommended by prior visits without much success.  Reports that symptoms began to become more consistent approximately 1 week ago.  No reported traumatic events.  Patient states that her nausea symptoms have subsided which has allowed her to be more active, patient believes this may be contributing to increased of numbness and tingling of the hands and feet.  No reported weaknesses of the upper or lower extremities.  Patient does notice symptoms seem to and around the heel.  Patient admits that she has been changing to a new pair of boots.  Curious if this may be contributing to symptoms.    Patient is seeing her massage therapist tomorrow as well as her OB/GYN in  [facility].    Area of chief complaint:  Cervical :  Symptoms are graded at 4/10. The quality is described as constant tightness.    Thoracic :  Symptoms are graded at 3/10. The quality is described as dull and sore.   Lumbar :  Symptoms are graded at 4/10. The quality is described as dull, and pinching.      Objective:  The following was observed:  /72 (BP Location: Right arm, Patient Position: Sitting)   Pulse 80   Temp 97.9  F (36.6  C) (Tympanic)   Resp 16   SpO2 97%      Adson's: +right, +left  Modified Adson's: +right, + left    [place]: +right, +left    Upper extremity strength unremarkable    P: palpatory tenderness  Right side C1, left side C6, scalene musculature right greater than left, T3 midline, T8 midline, PSIS bilaterally:    A: static palpation demonstrates intersegmental asymmetry , cervical, thoracic, lumbar, pelvis  R: motion palpation notes restricted motion, C1 , C6 , T3 , T8 , Sacrum  and PSIS Right   T: muscle spasm at level(s): Anterior and middle scalenes bilaterally, piriformis bilaterally right greater than left, mild of the paraspinal musculature cervical into thoracic region, upper trapezius bilaterally, quadratus lumborum bilaterally:      Segmental spinal dysfunction/restrictions found at:  :  C1 Left rotation restricted and Right lateral flexion restricted  C6 Right rotation restricted and Extension restriction  T3 Extension restriction  T8 Extension restriction  Sacrum Left lateral flexion restricted and Extension restriction  PSIS Right Flexion restriction.      Assessment: Symptoms do appear to be somewhat aggravated on today's visit.  New onset of numbness and tingling bilaterally possibly due to thoracic outlet syndrome.  Bilateral leg numbness and tingling possible etiology piriformis syndrome.  Instructed patient to discuss this with massage therapist as well as her OB/GYN.  Plan to follow-up with patient again next week.    Diagnoses:      1. Segmental and  somatic dysfunction of thoracic region    2. Segmental and somatic dysfunction of cervical region    3. Thoracic outlet syndrome    4. Segmental and somatic dysfunction of sacral region    5. Segmental and somatic dysfunction of pelvic region    6. Piriformis syndrome, left    7. Piriformis syndrome, right    8. Cervicalgia    9. Pain in thoracic spine    10. Bilateral low back pain with bilateral sciatica, unspecified chronicity        Patient's condition:  Patient had restrictions pre-manipulation    Treatment effectiveness:  Post manipulation there is better intersegmental movement and Patient claims to feel looser post manipulation      Procedures:  CMT:  72179 Chiropractic manipulative treatment 3-4 regions performed   Cervical: Diversified, C1 , C6, Supine  Thoracic: Diversified, T3, T8, Prone  Pelvis: Diversified, Sacrum , PSIS Right , Side posture    Modalities:  01163: IASTM: To Scalenes:  for 3 min    Therapeutic procedures:  Discussed scalene stretches and piriformis stretches    Response to Treatment  Reduction in symptoms as reported by patient    Prognosis: Good    Progress towards Goals: In progress  Reduce pain by 35-50%              Decrease positive ortho-neuro tests                 Patient will demonstrate effective use of home management strategies  New goal 12/9/2021-patient will report 50% reduction of radicular symptoms of the upper and lower extremities    Recommendations:    Instructions:Consult with massage therapy, OB/GYN regarding possible TOS, and piriformis etiology of symptoms    Follow-up:  Return to care in 1 week.

## 2021-12-14 ENCOUNTER — IMMUNIZATION (OUTPATIENT)
Dept: FAMILY MEDICINE | Facility: OTHER | Age: 36
End: 2021-12-14
Attending: FAMILY MEDICINE
Payer: COMMERCIAL

## 2021-12-14 PROCEDURE — 91300 PR COVID VAC PFIZER DIL RECON 30 MCG/0.3 ML IM: CPT

## 2021-12-14 PROCEDURE — 0002A PR COVID VAC PFIZER DIL RECON 30 MCG/0.3 ML IM: CPT

## 2021-12-17 ENCOUNTER — THERAPY VISIT (OUTPATIENT)
Dept: CHIROPRACTIC MEDICINE | Facility: OTHER | Age: 36
End: 2021-12-17
Attending: CHIROPRACTOR
Payer: COMMERCIAL

## 2021-12-17 VITALS
RESPIRATION RATE: 16 BRPM | OXYGEN SATURATION: 98 % | DIASTOLIC BLOOD PRESSURE: 70 MMHG | TEMPERATURE: 98 F | SYSTOLIC BLOOD PRESSURE: 128 MMHG | HEART RATE: 91 BPM

## 2021-12-17 DIAGNOSIS — M99.01 SEGMENTAL AND SOMATIC DYSFUNCTION OF CERVICAL REGION: Primary | ICD-10-CM

## 2021-12-17 DIAGNOSIS — M99.05 SEGMENTAL AND SOMATIC DYSFUNCTION OF PELVIC REGION: ICD-10-CM

## 2021-12-17 DIAGNOSIS — M54.2 CERVICALGIA: ICD-10-CM

## 2021-12-17 DIAGNOSIS — G54.0 THORACIC OUTLET SYNDROME: ICD-10-CM

## 2021-12-17 DIAGNOSIS — M99.04 SEGMENTAL AND SOMATIC DYSFUNCTION OF SACRAL REGION: ICD-10-CM

## 2021-12-17 DIAGNOSIS — M54.6 PAIN IN THORACIC SPINE: ICD-10-CM

## 2021-12-17 DIAGNOSIS — G57.01 PIRIFORMIS SYNDROME, RIGHT: ICD-10-CM

## 2021-12-17 DIAGNOSIS — M99.02 SEGMENTAL AND SOMATIC DYSFUNCTION OF THORACIC REGION: ICD-10-CM

## 2021-12-17 PROCEDURE — 98941 CHIROPRACT MANJ 3-4 REGIONS: CPT | Mod: AT | Performed by: CHIROPRACTOR

## 2021-12-17 NOTE — PROGRESS NOTES
Low back left constant dull soreness. 2/10 W24 2/10. Bilateral upper back frequent sore. 3/10 W24 3/10. Neck is constant sharp. 5/10 W24 5/10.  Marie Calix on 12/17/2021 at 8:12 AM     Visit #:  6/6-12    Subjective:  Jay Jay Jacobsen is a 36 year old female who is seen in f/u up for:        Segmental and somatic dysfunction of cervical region  Segmental and somatic dysfunction of thoracic region  Thoracic outlet syndrome  Segmental and somatic dysfunction of sacral region  Segmental and somatic dysfunction of pelvic region  Piriformis syndrome, right  Cervicalgia  Pain in thoracic spine.     Since last visit on 12/9/2021,  Jay Jay Jacobsen reports: Symptoms showing some improvement.  No massage since last visit. OB/GYN concurs with TOS and piriformis syndrome, believes that if this is culprit of patient's symptoms symptoms will likely worsen more rapidly than usual.. Rt foot numb around heel, not left side as was noted previously., hands numb bilaterally. Wakes up due to numbness, and due to uncomfortable. 17 weeks pregnant at time of appointment.    Area of chief complaint:  Lumbar :  Symptoms are graded at 2/10. The quality is described as dull, soreness.    Thoracic :  Symptoms are graded at 3/10. The quality is described as sore.    Cervical :  Symptoms are graded at 5/10. The quality is described as sharp.      Objective:  The following was observed:  /70 (BP Location: Right arm, Patient Position: Sitting)   Pulse 91   Temp 98  F (36.7  C) (Tympanic)   Resp 16   SpO2 98%      P: palpatory tenderness C2 right, C7 left, T2-3, T6, T9, left side L5, right PSIS:    A: static palpation demonstrates intersegmental asymmetry , cervical, thoracic, lumbar, pelvis  R: motion palpation notes restricted motion, C2 , C7 , T2 , T3 , T6 , T9 , L5  and Sacrum   T: muscle spasm at level(s):  scalenes bilaterally, upper trapezius R>>L, right levator scapuale, lumbar paraspinals and quadratus lumborum  bilaterally L>>R:      Segmental spinal dysfunction/restrictions found at:  :  C2 Right lateral flexion restricted and Extension restriction  C7 Right rotation restricted, Left lateral flexion restricted and Extension restriction  T2 Left lateral flexion restricted and Extension restriction  T3 Right lateral flexion restricted and Extension restriction  T6 Extension restriction  T9 Extension restriction  L5 Right rotation restricted, Left lateral flexion restricted and Extension restriction  Sacrum Right lateral flexion restricted and Extension restriction.      Assessment: Patient responding favorably with course of treatment. Encourage patient to continue with massage therapy. Discussed physical therapy if necessary. Plan to follow-up again with patient in approximately 1 week.    Diagnoses:      1. Segmental and somatic dysfunction of cervical region    2. Segmental and somatic dysfunction of thoracic region    3. Thoracic outlet syndrome    4. Segmental and somatic dysfunction of sacral region    5. Segmental and somatic dysfunction of pelvic region    6. Piriformis syndrome, right    7. Cervicalgia    8. Pain in thoracic spine        Patient's condition:  Patient had restrictions pre-manipulation and Patient symptoms are gradually improving    Treatment effectiveness:  Post manipulation there is better intersegmental movement and Patient claims to feel looser post manipulation      Procedures:  CMT:  47145 Chiropractic manipulative treatment 3-4 regions performed   Cervical: Diversified, C2, C7 , Supine  Thoracic: Diversified, T2, T3, T6, T9, Prone  Lumbar: Diversified, L5, Side posture  Pelvis: Diversified, Sacrum , Side posture    Modalities:  06344: MSTM:  To Quad lumb, Traps and scalenes:   for 4 min    Therapeutic procedures:  Reviewed nerve glides    Response to Treatment  Reduction in symptoms as reported by patient    Prognosis: Good    Progress towards Goals: Patient is making progress towards the  goal.     Recommendations:    Instructions:stretch as instructed at visit    Follow-up:  Return to care in 1 week.

## 2021-12-17 NOTE — PROGRESS NOTES
Low back left constant dull soreness. 2/10 W24 2/10. Bilateral upper back frequent sore. 3/10 W24 3/10. Neck is constant sharp. 5/10 W24 5/10.  Marie Calix on 12/17/2021 at 8:12 AM

## 2021-12-22 ENCOUNTER — THERAPY VISIT (OUTPATIENT)
Dept: CHIROPRACTIC MEDICINE | Facility: OTHER | Age: 36
End: 2021-12-22
Attending: CHIROPRACTOR
Payer: COMMERCIAL

## 2021-12-22 VITALS
RESPIRATION RATE: 16 BRPM | HEART RATE: 86 BPM | DIASTOLIC BLOOD PRESSURE: 66 MMHG | OXYGEN SATURATION: 98 % | TEMPERATURE: 98.1 F | SYSTOLIC BLOOD PRESSURE: 128 MMHG

## 2021-12-22 DIAGNOSIS — M99.02 SEGMENTAL AND SOMATIC DYSFUNCTION OF THORACIC REGION: ICD-10-CM

## 2021-12-22 DIAGNOSIS — M99.04 SEGMENTAL AND SOMATIC DYSFUNCTION OF SACRAL REGION: ICD-10-CM

## 2021-12-22 DIAGNOSIS — G57.01 PIRIFORMIS SYNDROME, RIGHT: ICD-10-CM

## 2021-12-22 DIAGNOSIS — G54.0 THORACIC OUTLET SYNDROME: ICD-10-CM

## 2021-12-22 DIAGNOSIS — M54.2 CERVICALGIA: ICD-10-CM

## 2021-12-22 DIAGNOSIS — M99.01 SEGMENTAL AND SOMATIC DYSFUNCTION OF CERVICAL REGION: Primary | ICD-10-CM

## 2021-12-22 PROCEDURE — 98941 CHIROPRACT MANJ 3-4 REGIONS: CPT | Performed by: CHIROPRACTOR

## 2021-12-22 NOTE — PROGRESS NOTES
Lower back both hips constant sore. 2/10 W24 2/10. Upper back is constant dull and sharp pain. 3/10 W24 3/10. Has been using heat and this provides a decrease in pain. Frequent neck soreness. 4/10 W24 4/10.  Has been using heat. Also had a massage yesterday which has increased pain.  Matt Retana DC on 12/22/2021 at 10:35 AM    Visit #:  7/6-12    Subjective:  Jay Jay Jacobsen is a 36 year old female who is seen in f/u up for:        Segmental and somatic dysfunction of cervical region  Segmental and somatic dysfunction of thoracic region  Thoracic outlet syndrome  Segmental and somatic dysfunction of sacral region  Piriformis syndrome, right  Cervicalgia.     Since last visit on 12/17/2021,  Jay Jay Jacobsen reports: Symptoms slightly aggravated today.  Reports undergoing massage therapy session yesterday which seems to have aggravated neck back and shoulder pain.  Notes that overall numbness and tingling of the right foot and arms bilaterally seem to be decreasing.  At last OB/GYN appointment patient obtained physical therapy referral.    Area of chief complaint:  Cervical :  Symptoms are graded at 4/10. The quality is described as frequently sore.    Thoracic :  Symptoms are graded at 3/10. The quality is described as sharp, dull.    Lumbar :  Symptoms are graded at 2/10. The quality is described as constantly sore.       Objective:  The following was observed:  /66 (BP Location: Right arm, Patient Position: Sitting)   Pulse 86   Temp 98.1  F (36.7  C) (Tympanic)   Resp 16   SpO2 98%      P: palpatory tenderness C2 left, C5 right, T3 midline, left PSIS:    A: static palpation demonstrates intersegmental asymmetry , cervical, thoracic, pelvis  R: motion palpation notes restricted motion, C2 , C5 , T3  and Sacrum   T: muscle spasm at level(s): Mild upper trapezius bilaterally, left quadratus lumborum:      Segmental spinal dysfunction/restrictions found at:  :  C2 Left lateral flexion restricted  and Extension restriction  C5 Right lateral flexion restricted and Extension restriction  T3 Left lateral flexion restricted and Extension restriction  Sacrum Left lateral flexion restricted and Extension restriction.      Assessment: Symptoms are showing improvement despite mild flareup from recent massage therapy.  Discussed local physical therapy both at [facility] [place] and with [facility] therapy.  Encourage the patient to choose where she found most amount of care provided.    Diagnoses:      1. Segmental and somatic dysfunction of cervical region    2. Segmental and somatic dysfunction of thoracic region    3. Thoracic outlet syndrome    4. Segmental and somatic dysfunction of sacral region    5. Piriformis syndrome, right    6. Cervicalgia        Patient's condition:  Patient had restrictions pre-manipulation    Treatment effectiveness:  Post manipulation there is better intersegmental movement and Patient claims to feel looser post manipulation      Procedures:  CMT:  07201 Chiropractic manipulative treatment 3-4 regions performed   Cervical: Diversified, C2, C5 , Supine  Thoracic: Diversified, T3, Prone  Pelvis: Diversified, Sacrum , Side posture    Modalities:  None performed this visit    Therapeutic procedures:  None  Encourage scapular stabilization exercises through crossover symmetry    Response to Treatment  Reduction in symptoms as reported by patient    Prognosis: Good    Progress towards Goals: Patient is making progress towards the goal.     Recommendations:    Instructions:Consider crossover symmetry    Follow-up:  Return to care in 1 week.

## 2021-12-30 ENCOUNTER — THERAPY VISIT (OUTPATIENT)
Dept: CHIROPRACTIC MEDICINE | Facility: OTHER | Age: 36
End: 2021-12-30
Attending: CHIROPRACTOR
Payer: COMMERCIAL

## 2021-12-30 VITALS
RESPIRATION RATE: 18 BRPM | DIASTOLIC BLOOD PRESSURE: 72 MMHG | HEART RATE: 70 BPM | TEMPERATURE: 98 F | SYSTOLIC BLOOD PRESSURE: 128 MMHG | OXYGEN SATURATION: 96 %

## 2021-12-30 DIAGNOSIS — G54.0 THORACIC OUTLET SYNDROME: ICD-10-CM

## 2021-12-30 DIAGNOSIS — M99.03 SEGMENTAL AND SOMATIC DYSFUNCTION OF LUMBAR REGION: ICD-10-CM

## 2021-12-30 DIAGNOSIS — M99.02 SEGMENTAL AND SOMATIC DYSFUNCTION OF THORACIC REGION: ICD-10-CM

## 2021-12-30 DIAGNOSIS — M99.01 SEGMENTAL AND SOMATIC DYSFUNCTION OF CERVICAL REGION: ICD-10-CM

## 2021-12-30 DIAGNOSIS — G57.01 PIRIFORMIS SYNDROME, RIGHT: ICD-10-CM

## 2021-12-30 DIAGNOSIS — M99.04 SEGMENTAL AND SOMATIC DYSFUNCTION OF SACRAL REGION: Primary | ICD-10-CM

## 2021-12-30 PROCEDURE — 98941 CHIROPRACT MANJ 3-4 REGIONS: CPT | Performed by: CHIROPRACTOR

## 2021-12-30 NOTE — PROGRESS NOTES
Neck rates 4/10 W24 4/10. Constant sore. Using heat with no change in pain. Upper left side back rates 2/10 W24 2/10. Constant sore and tightness.Using heat with no change in pain. Low left side back pain rates 3/10 W24 3/10. Constant tightness. Using heat with no change pain.  Tori Leatha on 12/30/2021 at 8:15 AM      Visit #:  8/12    Subjective:  Jay Jay Jacobsen is a 36 year old female who is seen in f/u up for:        Segmental and somatic dysfunction of sacral region  Segmental and somatic dysfunction of lumbar region  Segmental and somatic dysfunction of cervical region  Segmental and somatic dysfunction of thoracic region  Thoracic outlet syndrome  Piriformis syndrome, right.     Since last visit on 12/22/2021,  Jay Jay Jacobsen reports: Symptoms showed improvement for approximately 3 days after last visit.  Continuing to have bilateral numbness in her hands as well as into the right foot.  Has massage therapy next week.  Has found quite a bit of relief from symptoms with therapeutic cupping.    Still awaiting physical therapy orders from OB.    Area of chief complaint:  Cervical :  Symptoms are graded at 4/10. The quality is described as sore.    Thoracic :  Symptoms are graded at 2/10. The quality is described as sore and tight.    Lumbar :  Symptoms are graded at 3/10. The quality is described as tight.       Objective:  The following was observed:  /72 (BP Location: Right arm, Patient Position: Sitting)   Pulse 70   Temp 98  F (36.7  C) (Tympanic)   Resp 18   SpO2 96%      P: palpatory tenderness suboccipitals bilaterally, C6 on left, T4 midline, T10 midline, left PSIS, right side L4   A: static palpation demonstrates intersegmental asymmetry , cervical, thoracic, lumbar, pelvis  R: motion palpation notes restricted motion, C2 , C6 , T4 , T10, L4  and Sacrum   T: muscle spasm at level(s): suboccipitals bilaterally, upper trapezius bilaterally, paraspinals in cervical, thoracic and  lumbar regions bilaterally, quadratus lumborum bilaterally:      Segmental spinal dysfunction/restrictions found at:  :  C2 Left rotation restricted, Right lateral flexion restricted and Extension restriction  C6 Right rotation restricted and Extension restriction  T4 Extension restriction  T10 Extension restriction  L4 Left rotation restricted, Right lateral flexion restricted and Extension restriction  Sacrum Left lateral flexion restricted and Extension restriction.      Assessment: Muscles are more spasmed on today's visit comparatively to last patient encounter.  Believe that PT, chiropractic, massage therapy will likely provide patient with best amounts of relief and benefit through course of pregnancy.    Diagnoses:      1. Segmental and somatic dysfunction of sacral region    2. Segmental and somatic dysfunction of lumbar region    3. Segmental and somatic dysfunction of cervical region    4. Segmental and somatic dysfunction of thoracic region    5. Thoracic outlet syndrome    6. Piriformis syndrome, right        Patient's condition:  Patient had restrictions pre-manipulation and Symptoms come and go    Treatment effectiveness:  Post manipulation there is better intersegmental movement and Patient claims to feel looser post manipulation      Procedures:  CMT:  12478 Chiropractic manipulative treatment 3-4 regions performed   Cervical: Diversified, C2, C6, Supine  Thoracic: Diversified, T4, T10, Prone  Lumbar: Diversified, L4, Side posture  Pelvis: Diversified, Sacrum , Side posture    Modalities:  77396: MSTM:  To Sub-occipital and Traps  for 4 min    Therapeutic procedures:  None    Response to Treatment  Reduction in symptoms as reported by patient    Prognosis: Good    Progress towards Goals: Patient is making progress towards the goal.     Recommendations:    Instructions:monitor symptoms closely    Follow-up:  Continue treatment PRN.

## 2022-01-05 ENCOUNTER — THERAPY VISIT (OUTPATIENT)
Dept: CHIROPRACTIC MEDICINE | Facility: OTHER | Age: 37
End: 2022-01-05
Attending: CHIROPRACTOR
Payer: COMMERCIAL

## 2022-01-05 VITALS
HEART RATE: 93 BPM | SYSTOLIC BLOOD PRESSURE: 128 MMHG | RESPIRATION RATE: 18 BRPM | DIASTOLIC BLOOD PRESSURE: 68 MMHG | OXYGEN SATURATION: 97 % | TEMPERATURE: 98.3 F

## 2022-01-05 DIAGNOSIS — M99.01 SEGMENTAL AND SOMATIC DYSFUNCTION OF CERVICAL REGION: ICD-10-CM

## 2022-01-05 DIAGNOSIS — M99.02 SEGMENTAL AND SOMATIC DYSFUNCTION OF THORACIC REGION: ICD-10-CM

## 2022-01-05 DIAGNOSIS — M54.6 PAIN IN THORACIC SPINE: ICD-10-CM

## 2022-01-05 DIAGNOSIS — G57.01 PIRIFORMIS SYNDROME, RIGHT: ICD-10-CM

## 2022-01-05 DIAGNOSIS — M99.04 SEGMENTAL AND SOMATIC DYSFUNCTION OF SACRAL REGION: ICD-10-CM

## 2022-01-05 DIAGNOSIS — G54.0 THORACIC OUTLET SYNDROME: ICD-10-CM

## 2022-01-05 DIAGNOSIS — M99.03 SEGMENTAL AND SOMATIC DYSFUNCTION OF LUMBAR REGION: ICD-10-CM

## 2022-01-05 DIAGNOSIS — M54.2 CERVICALGIA: Primary | ICD-10-CM

## 2022-01-05 PROCEDURE — 98941 CHIROPRACT MANJ 3-4 REGIONS: CPT | Mod: AT | Performed by: CHIROPRACTOR

## 2022-01-05 NOTE — PROGRESS NOTES
Neck constant sharp. 4/10 W24 4/10. Using heat with no change in pain. Bilateral upper back is occasional sore. 3/10 W24 4/10. Using heat and this loosens it up a bit. Left lower back and hip. Constantly sore. 3/10 W24 3/10. Using heat with no change in pain.  Marie Calix on 1/5/2022 at 8:11 AM    Visit #:  9/12    Subjective:  Jay Jay Jacobsen is a 36 year old female who is seen in f/u up for:        Cervicalgia  Segmental and somatic dysfunction of cervical region  Segmental and somatic dysfunction of thoracic region  Pain in thoracic spine  Segmental and somatic dysfunction of sacral region  Segmental and somatic dysfunction of lumbar region  Thoracic outlet syndrome  Piriformis syndrome, right.     Since last visit on 12/30/2021,  Jay Jay Jacobsen reports: symptoms continue to be present, finds temporary improvement of symptoms with treatment. At this time still waiting on PT orders to start treatment. Has been finding her left sided sciatica symptoms are improving, has been trying different boots which also seem to help.    Hand numbness and tingling still present.    Area of chief complaint:  Cervical :  Symptoms are graded at 4/10. The quality is described as sharp.    Thoracic :  Symptoms are graded at 3-4/10. The quality is described as sore.    Lumbar :  Symptoms are graded at 3/10. The quality is described as sore.       Objective:  The following was observed:  /68 (BP Location: Right arm, Patient Position: Sitting)   Pulse 93   Temp 98.3  F (36.8  C) (Tympanic)   Resp 18   SpO2 97%      P: palpatory tenderness suboccipital ridge bilaterally, T4 on right, TL junction on left, L5 on right, left PSIS:    A: static palpation demonstrates intersegmental asymmetry , cervical, thoracic, lumbar, pelvis  R: motion palpation notes restricted motion, C1 , C2 , T4 , T12 , L5  and Sacrum   T: muscle spasm at level(s):  moderate of the left quadratus lumborum, lumbar paraspinals bilaterally, upper  trapezius bilaterally and scalenes bilaterally, suboccipitals bilaterally:      Segmental spinal dysfunction/restrictions found at:  :  C1 Left rotation restricted and Right lateral flexion restricted  C2 Right rotation restricted and Extension restriction  T4 Right lateral flexion restricted and Extension restriction  T12 Left lateral flexion restricted and Extension restriction  L5 Left rotation restricted, Right lateral flexion restricted and Extension restriction  Sacrum Left lateral flexion restricted and Extension restriction.      Assessment: Subjectively pain levels seem relatively unchanged.  Objectively Oswestry scores are improving.  However neck disability scores have increased.  Plan to follow-up with patient for additional visits at once week schedule.  Reevaluation of patient should be performed within the next couple of weeks.    Diagnoses:      1. Cervicalgia    2. Segmental and somatic dysfunction of cervical region    3. Segmental and somatic dysfunction of thoracic region    4. Pain in thoracic spine    5. Segmental and somatic dysfunction of sacral region    6. Segmental and somatic dysfunction of lumbar region    7. Thoracic outlet syndrome    8. Piriformis syndrome, right        Patient's condition:  Patient had restrictions pre-manipulation and Symptoms come and go    Treatment effectiveness:  Post manipulation there is better intersegmental movement and Patient states that they feel much better post manipulation but they gradually tighten up over time      Procedures:  CMT:  88159 Chiropractic manipulative treatment 3-4 regions performed   Cervical: Diversified, C1 , C2, Supine  Thoracic: Diversified, T4, T12, Prone  Lumbar: Diversified, L5, Side posture  Pelvis: Diversified, Sacrum , Side posture    Modalities:  None performed this visit    Therapeutic procedures:  None    Response to Treatment  Reduction in symptoms as reported by patient    Prognosis: Good    Progress towards Goals:  Patient is making progress towards the goal.     Recommendations:    Instructions:none    Follow-up:  Return to care in 1 week.

## 2022-01-13 ENCOUNTER — THERAPY VISIT (OUTPATIENT)
Dept: CHIROPRACTIC MEDICINE | Facility: OTHER | Age: 37
End: 2022-01-13
Attending: CHIROPRACTOR
Payer: COMMERCIAL

## 2022-01-13 VITALS
TEMPERATURE: 98.5 F | DIASTOLIC BLOOD PRESSURE: 70 MMHG | RESPIRATION RATE: 16 BRPM | HEART RATE: 80 BPM | OXYGEN SATURATION: 96 % | SYSTOLIC BLOOD PRESSURE: 120 MMHG

## 2022-01-13 DIAGNOSIS — M99.01 SEGMENTAL AND SOMATIC DYSFUNCTION OF CERVICAL REGION: ICD-10-CM

## 2022-01-13 DIAGNOSIS — G54.0 THORACIC OUTLET SYNDROME: ICD-10-CM

## 2022-01-13 DIAGNOSIS — M99.02 SEGMENTAL AND SOMATIC DYSFUNCTION OF THORACIC REGION: ICD-10-CM

## 2022-01-13 DIAGNOSIS — G57.01 PIRIFORMIS SYNDROME, RIGHT: ICD-10-CM

## 2022-01-13 DIAGNOSIS — M54.2 CERVICALGIA: Primary | ICD-10-CM

## 2022-01-13 DIAGNOSIS — M99.04 SEGMENTAL AND SOMATIC DYSFUNCTION OF SACRAL REGION: ICD-10-CM

## 2022-01-13 PROCEDURE — 98941 CHIROPRACT MANJ 3-4 REGIONS: CPT | Mod: AT | Performed by: CHIROPRACTOR

## 2022-01-13 NOTE — PROGRESS NOTES
Left lower back and hip is occasionally tight. 2/10 W24 2/10. Neck is constant sore and pinching. 4/10 W24 4/10. Using heat with no change in pain. Bilateral upper back is constantly tight. 3/10 W24 3/10. Using heat with no change in pain.   Marie Calix on 1/13/2022 at 8:09 AM    Visit #:  10/12    Subjective:  Jay Jay Jacobsen is a 36 year old female who is seen in f/u up for:        Cervicalgia  Segmental and somatic dysfunction of cervical region  Segmental and somatic dysfunction of thoracic region  Thoracic outlet syndrome  Segmental and somatic dysfunction of sacral region  Piriformis syndrome, right.     Since last visit on 1/5/2022,  Jay Jay Jacobsen reports:  PT unable to start until next week. Veena for massage last week, didn't find as much relief. Graston technique helpful. Still radiating both hands and right heel, left heel is mildly numb. Chiropractic care is providing help with her pain symptoms.    Has restarted yoga, feels good. Still having symptoms.    Area of chief complaint:  Lumbar :  Symptoms are graded at 2/10. The quality is described as tight.    Thoracic :  Symptoms are graded at 3/10. The quality is described as tight.   Cervical :  Symptoms are graded at 4/10. The quality is described as sore and pinching.      Objective:  The following was observed:  /70 (BP Location: Right arm, Patient Position: Sitting)   Pulse 80   Temp 98.5  F (36.9  C) (Tympanic)   Resp 16   SpO2 96%      P: palpatory tenderness C1 right, C5 left, CT junction T2-3, T6, T11, PSIS bilaterally:    A: static palpation demonstrates intersegmental asymmetry , cervical, thoracic, pelvis  R: motion palpation notes restricted motion, C1 , C5 , T2 , T3 , T6 , T11  and Sacrum   T: muscle spasm at level(s): upper trapezius and scalenes bilaterally, quadratus lumborum bilaterally, right suboccipitals:      Segmental spinal dysfunction/restrictions found at:  :  C1 Left rotation restricted and Right lateral  flexion restricted  C5 Left lateral flexion restricted and Extension restriction  T2 Left lateral flexion restricted and Extension restriction  T3 Right lateral flexion restricted and Extension restriction  T6 Extension restriction  T11 Extension restriction  Sacrum Flexion restriction.      Assessment: Pain symptoms are showing improvement.  Radicular symptoms however continue to be quite present.  Continue on with care at once a week.  Work to coordinate efforts with physical therapy.    Diagnoses:      1. Cervicalgia    2. Segmental and somatic dysfunction of cervical region    3. Segmental and somatic dysfunction of thoracic region    4. Thoracic outlet syndrome    5. Segmental and somatic dysfunction of sacral region    6. Piriformis syndrome, right        Patient's condition:  Patient had restrictions pre-manipulation and Symptoms come and go    Treatment effectiveness:  Post manipulation there is better intersegmental movement and Patient states that they feel much better post manipulation but they gradually tighten up over time      Procedures:  CMT:  62287 Chiropractic manipulative treatment 3-4 regions performed   Cervical: Diversified, C1 , C5 , Supine  Thoracic: Diversified, T2, T3, T6, T11, Prone  Pelvis: Drop Table, Sacrum , Prone    Modalities:  73501: IASTM: To Lesvia and clinton:  for 3 min    Therapeutic procedures:  None    Response to Treatment  Reduction in symptoms as reported by patient    Prognosis: Good    Progress towards Goals: Patient is making progress towards the goal.   Reduce pain by 35-50%              Decrease positive ortho-neuro tests                 Patient will demonstrate effective use of home management strategies  New goal 12/9/2021-patient will report 50% reduction of radicular symptoms of the upper and lower extremities    Recommendations:    Instructions:none    Follow-up:  Return to care in 1 week.

## 2022-01-19 ENCOUNTER — HOSPITAL ENCOUNTER (OUTPATIENT)
Dept: PHYSICAL THERAPY | Facility: OTHER | Age: 37
Setting detail: THERAPIES SERIES
End: 2022-01-19
Attending: NURSE PRACTITIONER
Payer: COMMERCIAL

## 2022-01-19 DIAGNOSIS — M25.511 RIGHT SHOULDER PAIN, UNSPECIFIED CHRONICITY: ICD-10-CM

## 2022-01-19 PROCEDURE — 97162 PT EVAL MOD COMPLEX 30 MIN: CPT | Mod: GP

## 2022-01-19 PROCEDURE — 97110 THERAPEUTIC EXERCISES: CPT | Mod: GP

## 2022-01-19 PROCEDURE — 97140 MANUAL THERAPY 1/> REGIONS: CPT | Mod: GP

## 2022-01-19 NOTE — PROGRESS NOTES
01/19/22 0800   General Information   Type of Visit Initial OP Ortho PT Evaluation   Start of Care Date 01/19/22   Referring Physician Dr. Sullivan   Patient/Family Goals Statement Pt would like to decrease numbness in hands feet.    Orders Evaluate and Treat   Orders Comment PT needing to request orders from MD for foot pain.    Date of Order 01/05/22   Certification Required? No   Medical Diagnosis R shoulder pain, M25.511   Surgical/Medical history reviewed Yes   Precautions/Limitations no known precautions/limitations   General Information Comments Pt is a 36 year old female referred to skilled PT services following an increase in B hand numbness and B wrist pain. Pt also reports an increase in calcaneal pain in R foot more than L (has history of arches collapsing) but has gotten a lot worse while pregnant. Pt is very motivated to address issues as they have been increasing throughout her pregnancy. pt does report she has a history of breast augmentation and posture issues even before becoming pregnant. Pt feels most issues started before pregancy but have gotten a lot worse over the last 2 months. Pt reports chiro has helped decrease numbness and tingling especially into her hands but still does get numbness even with massage and chiro (massage therapist is out until february due to having a baby herself).    Presentation and Etiology   Pertinent history of current problem (include personal factors and/or comorbidities that impact the POC) PMH: Pregnant now, history of foot issues and R knee issues, reports history of poor posture.    Impairments A. Pain;D. Decreased ROM;E. Decreased flexibility;F. Decreased strength and endurance;G. Impaired balance;K. Numbness;L. Tingling   Functional Limitations perform activities of daily living;perform required work activities   Symptom Location B hands, B upper traps, neck, R foot   How/Where did it occur Other  (few months into pregnancy)   Onset date of current  episode/exacerbation 11/19/21   Chronicity New   Pain rating (0-10 point scale) Best (/10);Worst (/10)   Best (/10) 3/10   Worst (/10) 7/10   Pain quality B. Dull;C. Aching;D. Burning;G. Cramping   Frequency of pain/symptoms A. Constant   Pain/symptoms are: Worse during the day   Pain/symptoms exacerbated by E. Rest;I. Bending;H. Overhead reach   Pain/symptoms eased by K. Other   Pain eased by comment yoga, graston/chiro   Progression of symptoms since onset: Worsened   Prior Level of Function   Prior Level of Function-Mobility independent   Prior Level of Function-ADLs independent   Functional Level Prior Comment pt lives with  in home, was very active and running but pregnancy has limited pt to yoga and walking.    Current Level of Function   Current Community Support Family/friend caregiver   Patient role/employment history A. Employed   Employment Comments works for [employer]   Living environment House/townhome   Current equipment-Gait/Locomotion None   Current equipment-ADL None   Fall Risk Screen   Fall screen completed by PT   Have you fallen 2 or more times in the past year? No   Have you fallen and had an injury in the past year? No   Is patient a fall risk? No   Abuse Screen (yes response referral indicated)   Feels Unsafe at Home or Work/School no   Feels Threatened by Someone no   Does Anyone Try to Keep You From Having Contact with Others or Doing Things Outside Your Home? no   Physical Signs of Abuse Present no   Cervical Spine   Observation pt is pregnant and baby due in May   Posture increased lordodic curve with pregnancy   Cervical Flexion ROM WFL   Cervical Extension ROM WFL   Cervical Right Side Bending ROM decreased  (visual assessment)   Cervical Left Side Bending ROM decreased   Cervical Right Rotation ROM decreased (needs formal measurement)   Cervical Left Rotation ROM decreased   Shoulder AROM Screen WFL but numbness into hands with ABD   Shoulder Shrug (C2-C4) Strength 5/5    Shoulder Abd (C5) Strength 4+/5   Shoulder Add (C7) Strength 4+/5   Shoulder ER (C5, C6) Strength 4+/5   Shoulder IR (C5, C6) Strength 4+/5   Elbow Flexion (C5, C6) Strength 4+/5   Elbow Extension (C7) Strength 4+/5   Wrist Extension (C6) Strength 4+/5   Wrist Flexion (C7) Strength 4+/5   Upper Trapezius Flexibility decreased   Levator Scapula Flexibility decreased   Scalene Flexibility decreased   Pectoralis Minor Flexibility decreased   Cervical Flexibility Comments increased tissue tension B but R worse than L   Alar Ligament Test negative   Spurling Test negative   Cervical Distraction Test negative   Cervical Rotation/Lateral Flexion Test positive on R   Segmental Mobility-Cervical decreased at C5-C7   Segmental Mobility-Thoracic very stiff B first rib   Palpation significant tissue tension in B thoracic outlet R more than L   Neurological Testing Comments positive B median nerve, R more sensative than L   Planned Therapy Interventions   Planned Therapy Interventions gait training;balance training;joint mobilization;manual therapy;motor coordination training;neuromuscular re-education;ROM;strengthening;stretching   Planned Modality Interventions   Planned Modality Interventions Cryotherapy;Hot packs;Ultrasound;Traction;Electrical stimulation   Planned Modality Interventions Comments vasopneumatic compression   Clinical Impression   Criteria for Skilled Therapeutic Interventions Met yes, treatment indicated   PT Diagnosis neck and shoulder tissue tension with radicular symptoms.    Influenced by the following impairments pain, fatigue, weakness, decreased motor control, 1st rib immobility   Functional limitations due to impairments radicular symptoms into arms, lifting and sleeping difficulty, difficulty with bending.    Clinical Presentation Evolving/Changing   Clinical Presentation Rationale symptoms consist of multiple joints and pregnancy impacting difficulty    Clinical Decision Making (Complexity)  Moderate complexity   Therapy Frequency 2 times/Week   Predicted Duration of Therapy Intervention (days/wks) 12 weeks   Risk & Benefits of therapy have been explained Yes   Patient, Family & other staff in agreement with plan of care Yes   Clinical Impression Comments Pt is a 36 year old female referred to skilled PT services following an increase in neck and shoulder pain as well as the start of radicular symptoms into B hands. pt continues to have an increase in symptoms as pregnancy progresses and is also reporting variable R heel numbness impacting balance and function as well. Pt presents with positive median nerve and 1st rib symptoms and can benefit from skilled PT services to address deficits and progress toward self management of pain and weakness and improve pt sleeping abilities.    Education Assessment   Preferred Learning Style Listening;Reading   Barriers to Learning No barriers   ORTHO GOALS   PT Ortho Eval Goals 1;2;3;4   Ortho Goal 1   Goal Identifier Pain   Goal Description pt will report a 2/10 pain in neck and upper shoulders during ADL's to increase tolerance to daily tasks.    Target Date 03/02/22   Ortho Goal 2   Goal Identifier ROM   Goal Description Pt will demonstrate 20 deg of B neck side bending to increase neck ROM for dressing and driving.    Target Date 02/16/22   Ortho Goal 3   Goal Identifier Radicular symptoms   Goal Description Pt will report numbness/tingling intermittently to B elbows by the end of the day to increase dexterity in hands and improve sleeping tolerance   Target Date 03/16/22   Ortho Goal 4   Goal Identifier sleeping   Goal Description pt will report no sleep disturbances due to upper body pain or numbness for 1 week to progress towards prior sleep hygiene.    Target Date 04/13/22   Total Evaluation Time   PT Trina, Moderate Complexity Minutes (29120) 20

## 2022-01-20 ENCOUNTER — THERAPY VISIT (OUTPATIENT)
Dept: CHIROPRACTIC MEDICINE | Facility: OTHER | Age: 37
End: 2022-01-20
Attending: CHIROPRACTOR
Payer: COMMERCIAL

## 2022-01-20 VITALS
DIASTOLIC BLOOD PRESSURE: 68 MMHG | SYSTOLIC BLOOD PRESSURE: 132 MMHG | TEMPERATURE: 97.5 F | OXYGEN SATURATION: 98 % | RESPIRATION RATE: 18 BRPM | HEART RATE: 83 BPM

## 2022-01-20 DIAGNOSIS — M54.2 CERVICALGIA: Primary | ICD-10-CM

## 2022-01-20 DIAGNOSIS — M99.02 SEGMENTAL AND SOMATIC DYSFUNCTION OF THORACIC REGION: ICD-10-CM

## 2022-01-20 DIAGNOSIS — M99.04 SEGMENTAL AND SOMATIC DYSFUNCTION OF SACRAL REGION: ICD-10-CM

## 2022-01-20 DIAGNOSIS — G54.0 THORACIC OUTLET SYNDROME: ICD-10-CM

## 2022-01-20 DIAGNOSIS — M99.01 SEGMENTAL AND SOMATIC DYSFUNCTION OF CERVICAL REGION: ICD-10-CM

## 2022-01-20 DIAGNOSIS — M54.50 LEFT LOW BACK PAIN, UNSPECIFIED CHRONICITY, UNSPECIFIED WHETHER SCIATICA PRESENT: ICD-10-CM

## 2022-01-20 PROCEDURE — 98941 CHIROPRACT MANJ 3-4 REGIONS: CPT | Mod: AT | Performed by: CHIROPRACTOR

## 2022-01-20 NOTE — PROGRESS NOTES
Visit #:  11/12    Subjective:  Jay Jay Jacobsen is a 36 year old female who is seen in f/u up for:        Cervicalgia  Segmental and somatic dysfunction of thoracic region  Segmental and somatic dysfunction of cervical region  Thoracic outlet syndrome  Segmental and somatic dysfunction of sacral region  Left low back pain, unspecified chronicity, unspecified whether sciatica present.     Since last visit on 1/13/2022,  Jay Jay Jacobsen reports: Symptoms do seem to show temporary improvement with treatment. Began physical therapy yesterday reports good results thus far. At this time patient's heel pain does not appear to be radicular in nature however this is still unclear. Currently waiting on orders to also include lower extremity treatments with PT. Discussed possibility of referring patient to occupational therapy for upper extremity issues.    Area of chief complaint:  Cervical :  Symptoms are graded at 5/10. The quality is described as achey.    Thoracic :  Symptoms are graded at 5/10. The quality is described as tight.     (DVPRS) Pain Rating Score : Distracts me, can do usual activities (W24 5/10) (01/20/22 0808)     Objective:  The following was observed:  /68 (BP Location: Right arm, Patient Position: Sitting)   Pulse 83   Temp 97.5  F (36.4  C) (Tympanic)   Resp 18   SpO2 98%      P: palpatory tenderness Right side C1, left side C6, T1 right, T2 left, T6 midline, T12 midline, PSIS right greater than left:    A: static palpation demonstrates intersegmental asymmetry , cervical, thoracic, pelvis  R: motion palpation notes restricted motion, C1 , C6 , T1 , T2 , T6 , T12  and Sacrum   T: muscle spasm at level(s): Right middle and posterior scalene, funniness muscle group on right, suboccipitals on right, upper trapezius and levator scapula on right, paraspinal musculature cervical and upper thoracic region, mild of the lumbar paraspinals bilaterally:      Segmental spinal  dysfunction/restrictions found at:  .:  C1 Left rotation restricted and Right lateral flexion restricted  C6 Right lateral flexion restricted and Extension restriction  T1 Left lateral flexion restricted and Extension restriction  T2 Left lateral flexion restricted and Extension restriction  T6 Extension restriction  T12 Left lateral flexion restricted and Extension restriction  Sacrum Left lateral flexion restricted and Extension restriction      Assessment: Patient is showing slow signs of progress. Symptoms still are affecting patient's ADLs. Plan for 1 additional visit before official reassessment of the patient.    Diagnoses:      1. Cervicalgia    2. Segmental and somatic dysfunction of thoracic region    3. Segmental and somatic dysfunction of cervical region    4. Thoracic outlet syndrome    5. Segmental and somatic dysfunction of sacral region    6. Left low back pain, unspecified chronicity, unspecified whether sciatica present        Patient's condition:  Patient had restrictions pre-manipulation    Treatment effectiveness:  Post manipulation there is better intersegmental movement and Patient states that they feel much better post manipulation but they gradually tighten up over time      Procedures:  CMT:  40654 Chiropractic manipulative treatment 3-4 regions performed   Cervical: Diversified, C1 , C6, Supine  Thoracic: Diversified, T1, T2, T6, T12, Prone  Pelvis: Drop Table, Sacrum , Prone    Modalities:  None performed this visit    Response to Treatment  Reduction in symptoms as reported by patient    Prognosis: Good    Progress towards Goals: Patient is making progress towards the goal.   Reduce pain by 35-50%              Decrease positive ortho-neuro tests                 Patient will demonstrate effective use of home management strategies  New goal 12/9/2021-patient will report 50% reduction of radicular symptoms of the upper and lower extremities    Recommendations:    Instructions:monitor  symptoms    Follow-up:  Return to care in 1 week.

## 2022-01-21 ENCOUNTER — HOSPITAL ENCOUNTER (OUTPATIENT)
Dept: PHYSICAL THERAPY | Facility: OTHER | Age: 37
Setting detail: THERAPIES SERIES
End: 2022-01-21
Payer: COMMERCIAL

## 2022-01-21 PROCEDURE — 97140 MANUAL THERAPY 1/> REGIONS: CPT | Mod: GP

## 2022-01-21 PROCEDURE — 97110 THERAPEUTIC EXERCISES: CPT | Mod: GP

## 2022-01-25 ENCOUNTER — HOSPITAL ENCOUNTER (OUTPATIENT)
Dept: PHYSICAL THERAPY | Facility: OTHER | Age: 37
Setting detail: THERAPIES SERIES
End: 2022-01-25
Payer: COMMERCIAL

## 2022-01-25 PROCEDURE — 97110 THERAPEUTIC EXERCISES: CPT | Mod: GP

## 2022-01-25 PROCEDURE — 97140 MANUAL THERAPY 1/> REGIONS: CPT | Mod: GP

## 2022-01-27 ENCOUNTER — THERAPY VISIT (OUTPATIENT)
Dept: CHIROPRACTIC MEDICINE | Facility: OTHER | Age: 37
End: 2022-01-27
Attending: CHIROPRACTOR
Payer: COMMERCIAL

## 2022-01-27 VITALS
TEMPERATURE: 98 F | DIASTOLIC BLOOD PRESSURE: 72 MMHG | SYSTOLIC BLOOD PRESSURE: 118 MMHG | OXYGEN SATURATION: 98 % | HEART RATE: 84 BPM | RESPIRATION RATE: 16 BRPM

## 2022-01-27 DIAGNOSIS — M99.01 SEGMENTAL AND SOMATIC DYSFUNCTION OF CERVICAL REGION: ICD-10-CM

## 2022-01-27 DIAGNOSIS — M54.2 CERVICALGIA: Primary | ICD-10-CM

## 2022-01-27 DIAGNOSIS — M54.50 LEFT LOW BACK PAIN, UNSPECIFIED CHRONICITY, UNSPECIFIED WHETHER SCIATICA PRESENT: ICD-10-CM

## 2022-01-27 DIAGNOSIS — M99.02 SEGMENTAL AND SOMATIC DYSFUNCTION OF THORACIC REGION: ICD-10-CM

## 2022-01-27 DIAGNOSIS — M99.04 SEGMENTAL AND SOMATIC DYSFUNCTION OF SACRAL REGION: ICD-10-CM

## 2022-01-27 PROCEDURE — 98941 CHIROPRACT MANJ 3-4 REGIONS: CPT | Mod: AT | Performed by: CHIROPRACTOR

## 2022-01-27 NOTE — PROGRESS NOTES
Neck is constantly tight. 4/10 W24 5/10. Using heat with no relief. Bilateral upper back is constant dull ache. 3/10 W24 3/10. Using heat with no relief. Left lower back is frequently sharp. 1/10 W24 1/10.   Marie Calix on 1/27/2022 at 8:24 AM    Visit #:  12/12    Subjective:  Jay Jay Jacobsen is a 36 year old female who is seen in f/u up for:        Cervicalgia  Segmental and somatic dysfunction of thoracic region  Segmental and somatic dysfunction of cervical region  Segmental and somatic dysfunction of sacral region  Left low back pain, unspecified chronicity, unspecified whether sciatica present.     Since last visit on 1/20/2022,  Jay Jay Jacobsen reports: symptoms do well about 1/2 week before they return.     PT currently 2x weekly, finding improvement. Symptoms seem to fluctuate. Feels like gait is being altered/ waddling more. con    Area of chief complaint:  Cervical :  Symptoms are graded at 4-5/10. The quality is described as tight.    Thoracic :  Symptoms are graded at 3/10. The quality is described as achey, dull.    Lumbar :  Symptoms are graded at 1/10. The quality is described as sharp.        (PRS) Pain Rating Score : Hardly notice pain (W24 1/10) (01/27/22 0822)     Objective:  The following was observed:  /72 (BP Location: Right arm, Patient Position: Sitting)   Pulse 84   Temp 98  F (36.7  C) (Tympanic)   Resp 16   SpO2 98%      P: palpatory tendernessC1 right, C6 left, T3, T10, left PSIS:    A: static palpation demonstrates intersegmental asymmetry , cervical, thoracic, pelvis  R: motion palpation notes restricted motion, C1 , C6 , T3 , T10 and Sacrum   T: muscle spasm at level(s): Mild paraspinal musculature thoracic and the lumbar regions bilaterally, upper trapezius bilaterally, left quadratus lumborum:      Segmental spinal dysfunction/restrictions found at:  :  C1 Left rotation restricted and Right lateral flexion restricted  C6 Right rotation restricted and  Extension restriction  T3 Extension restriction  T10 Extension restriction  Sacrum Extension restriction.      Assessment: Continuation of symptoms.  Plan for brief reassessment of patient on next visit    Upper extremity symptoms and lower extremity symptoms seeming less likely radiculopathy, consider more localized concern of the wrist and feet.    Diagnoses:      1. Cervicalgia    2. Segmental and somatic dysfunction of thoracic region    3. Segmental and somatic dysfunction of cervical region    4. Segmental and somatic dysfunction of sacral region    5. Left low back pain, unspecified chronicity, unspecified whether sciatica present        Patient's condition:  Patient had restrictions pre-manipulation and Symptoms come and go    Treatment effectiveness:  Post manipulation there is better intersegmental movement, Patient claims to feel looser post manipulation and Post manipulation the patient improves and then feels more restricted motion over time      Procedures:  CMT:  81141 Chiropractic manipulative treatment 3-4 regions performed   Cervical: Diversified and Mobilization, C1 , C6, Supine, mobilization for C1, C6 diversified  Thoracic: Diversified, T3, T10, Prone  Pelvis: Drop Table, Sacrum , Prone long axis distraction    Modalities:  None performed this visit    Therapeutic procedures:  None  Deferred to physical therapy    Response to Treatment  Reduction in symptoms as reported by patient    Prognosis: Good    Progress towards Goals: Patient is making progress towards the goal.   Reduce pain by 35-50%              Decrease positive ortho-neuro tests                 Patient will demonstrate effective use of home management strategies  New goal 12/9/2021-patient will report 50% reduction of radicular symptoms of the upper and lower extremities-cancel goal 1/27/2022    Recommendations:    Instructions:none    Follow-up:  Return to care in 1 week.

## 2022-01-28 ENCOUNTER — HOSPITAL ENCOUNTER (OUTPATIENT)
Dept: PHYSICAL THERAPY | Facility: OTHER | Age: 37
Setting detail: THERAPIES SERIES
End: 2022-01-28
Payer: COMMERCIAL

## 2022-01-28 PROCEDURE — 97140 MANUAL THERAPY 1/> REGIONS: CPT | Mod: GP

## 2022-01-28 PROCEDURE — 97110 THERAPEUTIC EXERCISES: CPT | Mod: GP

## 2022-02-01 ENCOUNTER — HOSPITAL ENCOUNTER (OUTPATIENT)
Dept: PHYSICAL THERAPY | Facility: OTHER | Age: 37
Setting detail: THERAPIES SERIES
End: 2022-02-01
Payer: COMMERCIAL

## 2022-02-01 PROCEDURE — 97110 THERAPEUTIC EXERCISES: CPT | Mod: GP

## 2022-02-01 PROCEDURE — 97140 MANUAL THERAPY 1/> REGIONS: CPT | Mod: GP

## 2022-02-03 ENCOUNTER — HOSPITAL ENCOUNTER (OUTPATIENT)
Dept: PHYSICAL THERAPY | Facility: OTHER | Age: 37
Setting detail: THERAPIES SERIES
End: 2022-02-03
Payer: COMMERCIAL

## 2022-02-03 PROCEDURE — 97110 THERAPEUTIC EXERCISES: CPT | Mod: GP

## 2022-02-03 PROCEDURE — 97140 MANUAL THERAPY 1/> REGIONS: CPT | Mod: GP

## 2022-02-04 ENCOUNTER — THERAPY VISIT (OUTPATIENT)
Dept: CHIROPRACTIC MEDICINE | Facility: OTHER | Age: 37
End: 2022-02-04
Attending: CHIROPRACTOR
Payer: COMMERCIAL

## 2022-02-04 VITALS
TEMPERATURE: 97.7 F | SYSTOLIC BLOOD PRESSURE: 132 MMHG | DIASTOLIC BLOOD PRESSURE: 68 MMHG | HEART RATE: 92 BPM | OXYGEN SATURATION: 97 % | RESPIRATION RATE: 16 BRPM

## 2022-02-04 DIAGNOSIS — M99.04 SEGMENTAL AND SOMATIC DYSFUNCTION OF SACRAL REGION: ICD-10-CM

## 2022-02-04 DIAGNOSIS — M54.6 PAIN IN THORACIC SPINE: ICD-10-CM

## 2022-02-04 DIAGNOSIS — M99.01 SEGMENTAL AND SOMATIC DYSFUNCTION OF CERVICAL REGION: ICD-10-CM

## 2022-02-04 DIAGNOSIS — M54.50 LEFT LOW BACK PAIN, UNSPECIFIED CHRONICITY, UNSPECIFIED WHETHER SCIATICA PRESENT: ICD-10-CM

## 2022-02-04 DIAGNOSIS — M99.02 SEGMENTAL AND SOMATIC DYSFUNCTION OF THORACIC REGION: Primary | ICD-10-CM

## 2022-02-04 DIAGNOSIS — M54.2 CERVICALGIA: ICD-10-CM

## 2022-02-04 PROCEDURE — 98941 CHIROPRACT MANJ 3-4 REGIONS: CPT | Mod: AT | Performed by: CHIROPRACTOR

## 2022-02-04 NOTE — PROGRESS NOTES
Neck is constantly dull and sore. 3/10 W24 3/10.  Bilateral upper back is frequently sharp. 5/10 W24 5/10.  Lower left back/hip is constantly pinching. 3/10 W24 3/10.  Chairoderick Helene on 2/4/2022 at 8:18 AM    Visit #:  13    Subjective:  Jay Jay Jacobsen is a 36 year old female who is seen in f/u up for:        Segmental and somatic dysfunction of thoracic region  Pain in thoracic spine  Segmental and somatic dysfunction of cervical region  Cervicalgia  Segmental and somatic dysfunction of sacral region  Left low back pain, unspecified chronicity, unspecified whether sciatica present.     Since last visit on 1/27/2022,  Jay Jay Jacobsen reports: did well post adjustment for 4 days.   Massage on Wednesday, did well. Mid back primary today. Continuing to work with PT with beneficial results.       (DVPRS) Pain Rating Score : Sometimes distracts me (W24 3/10) (02/04/22 0814)     Objective:  The following was observed:  /68 (BP Location: Right arm, Patient Position: Sitting)   Pulse 92   Temp 97.7  F (36.5  C) (Tympanic)   Resp 16   SpO2 97%    Neck Disability Index (  [employer] 1991. All rights reserved.; used with permission) 2/4/2022   SECTION 1 - PAIN INTENSITY 1   SECTION 2 - PERSONAL CARE 0   SECTION 3 - LIFTING 0   SECTION 4 - READING 1   SECTION 5 - HEADACHES 0   SECTION 6 - CONCENTRATION 0   SECTION 7 - WORK 0   SECTION 8 - DRIVING 1   SECTION 9 - SLEEPING 0   SECTION 10 - RECREATION 0   Count 10   Sum 3   Raw Score: /50 3   Neck Disability Index Score: (%) 6     Oswestry (JEANETTE) Questionnaire    OSWESTRY DISABILITY INDEX 2/4/2022   Count 9   Sum 6   Oswestry Score (%) 13.33   Some recent data might be hidden          P: palpatory tenderness C1 right, C7 left, T2 right, T3 left, T6 midline, left PSIS:    A: static palpation demonstrates intersegmental asymmetry , cervical, thoracic, pelvis  R: motion palpation notes restricted motion, C1 , C7 , T2 , T3 , T6  and Sacrum   T: muscle  spasm at level(s): Rhomboids, T-spine paraspinal, Traps and left quadratus lumborum:      Segmental spinal dysfunction/restrictions found at:  :  C1 Left rotation restricted and Right lateral flexion restricted  C7 Left lateral flexion restricted and Extension restriction  T2 Right lateral flexion restricted and Extension restriction  T3 Left lateral flexion restricted and Extension restriction  T6 Extension restriction.  Sacrum extension restriction    Assessment: Patient is showing some progress albeit slowly. Suspect pregnancy is a major contributing factor for slower progress. Continue care at 1x week.     Diagnoses:      1. Segmental and somatic dysfunction of thoracic region    2. Pain in thoracic spine    3. Segmental and somatic dysfunction of cervical region    4. Cervicalgia    5. Segmental and somatic dysfunction of sacral region    6. Left low back pain, unspecified chronicity, unspecified whether sciatica present        Patient's condition:  Patient had restrictions pre-manipulation    Treatment effectiveness:  Post manipulation there is better intersegmental movement and Patient claims to feel looser post manipulation      Procedures:  CMT:  34500 Chiropractic manipulative treatment 3-4 regions performed   Cervical: Diversified, C7 , Supine, C1, mobilization/SOT, supine  Thoracic: Diversified, T2, T3, T6, Prone  Pelvis: Drop Table, Sacrum , Prone, long axis distraction    Modalities:  None performed this visit    Therapeutic procedures:  None  Deferred to physical therapy    Response to Treatment  Reduction in symptoms as reported by patient    Prognosis: Good    Progress towards Goals: Patient is making progress towards the goal.     Recommendations:    Instructions:none    Follow-up:  Return to care in 1 week.

## 2022-02-07 ENCOUNTER — HOSPITAL ENCOUNTER (OUTPATIENT)
Dept: PHYSICAL THERAPY | Facility: OTHER | Age: 37
Setting detail: THERAPIES SERIES
End: 2022-02-07
Attending: NURSE PRACTITIONER
Payer: COMMERCIAL

## 2022-02-07 PROCEDURE — 97112 NEUROMUSCULAR REEDUCATION: CPT | Mod: GP

## 2022-02-07 PROCEDURE — 97110 THERAPEUTIC EXERCISES: CPT | Mod: GP

## 2022-02-11 ENCOUNTER — HOSPITAL ENCOUNTER (OUTPATIENT)
Dept: PHYSICAL THERAPY | Facility: OTHER | Age: 37
Setting detail: THERAPIES SERIES
End: 2022-02-11
Attending: NURSE PRACTITIONER
Payer: COMMERCIAL

## 2022-02-11 PROCEDURE — 97112 NEUROMUSCULAR REEDUCATION: CPT | Mod: GP

## 2022-02-11 PROCEDURE — 97110 THERAPEUTIC EXERCISES: CPT | Mod: GP

## 2022-02-17 ENCOUNTER — HOSPITAL ENCOUNTER (OUTPATIENT)
Dept: PHYSICAL THERAPY | Facility: OTHER | Age: 37
Setting detail: THERAPIES SERIES
End: 2022-02-17
Attending: NURSE PRACTITIONER
Payer: COMMERCIAL

## 2022-02-17 PROCEDURE — 97112 NEUROMUSCULAR REEDUCATION: CPT | Mod: GP

## 2022-02-17 PROCEDURE — 97110 THERAPEUTIC EXERCISES: CPT | Mod: GP

## 2022-02-18 ENCOUNTER — THERAPY VISIT (OUTPATIENT)
Dept: CHIROPRACTIC MEDICINE | Facility: OTHER | Age: 37
End: 2022-02-18
Attending: CHIROPRACTOR
Payer: COMMERCIAL

## 2022-02-18 VITALS
SYSTOLIC BLOOD PRESSURE: 136 MMHG | TEMPERATURE: 98.9 F | OXYGEN SATURATION: 98 % | HEART RATE: 90 BPM | DIASTOLIC BLOOD PRESSURE: 78 MMHG | RESPIRATION RATE: 18 BRPM

## 2022-02-18 DIAGNOSIS — M99.02 SEGMENTAL AND SOMATIC DYSFUNCTION OF THORACIC REGION: ICD-10-CM

## 2022-02-18 DIAGNOSIS — M99.01 SEGMENTAL AND SOMATIC DYSFUNCTION OF CERVICAL REGION: Primary | ICD-10-CM

## 2022-02-18 DIAGNOSIS — M99.04 SEGMENTAL AND SOMATIC DYSFUNCTION OF SACRAL REGION: ICD-10-CM

## 2022-02-18 DIAGNOSIS — M54.6 PAIN IN THORACIC SPINE: ICD-10-CM

## 2022-02-18 DIAGNOSIS — M54.50 LEFT LOW BACK PAIN, UNSPECIFIED CHRONICITY, UNSPECIFIED WHETHER SCIATICA PRESENT: ICD-10-CM

## 2022-02-18 DIAGNOSIS — M54.2 CERVICALGIA: ICD-10-CM

## 2022-02-18 PROCEDURE — 98941 CHIROPRACT MANJ 3-4 REGIONS: CPT | Mod: AT | Performed by: CHIROPRACTOR

## 2022-02-18 NOTE — PROGRESS NOTES
Neck is constant sharp. Rates 6/10 W24 6/10.  Bilateral upper back is constant pinching. Rates 4/10 W24 4/10.  Lower left hip/back is constant sharp and pinching. Rates 3/10 W24 3/10.  Svendre Helene on 2/18/2022 at 8:18 AM    Visit #:  14    Subjective:  Jay Jay Jacobsen is a 36 year old female who is seen in f/u up for:        Segmental and somatic dysfunction of cervical region  Cervicalgia  Segmental and somatic dysfunction of thoracic region  Pain in thoracic spine  Segmental and somatic dysfunction of sacral region  Left low back pain, unspecified chronicity, unspecified whether sciatica present.     Since last visit on 2/4/2022,  Jay Jay Jacobsen reports: Symptoms continue to show temporary improvement for a couple of days post treatment before returning.  Currently working with physical therapy to help with symptoms as well.  Currently under observation for preeclampsia due to signs and symptoms.  Patient's blood pressure is stable at this time.    Lapse in patient care due to provider being unavailable.  Patient sought help from previous chiropractor.  Seems to provide some level of relief however not to feet and ankles.    Currently wearing compression gloves to help with hand swelling.      Area of chief complaint:  Neck is qualified as constantly sharp. Rates currently 6/10  Worst in the past 24 hours 6/10.  Bilateral upper back is qualified as constantly pinching. Rates currently 4/10 worst in the past 24 hours 4/10.  Lower left hip/back is qualified as constantly sharp and pinching. Rates currently 3/10 worst in the past 24 hours 3/10.    (DVPRS) Pain Rating Score : Sometimes distracts me (W24 3/10) (02/18/22 0815)     Objective:  The following was observed:  /78 (BP Location: Right arm, Patient Position: Sitting)   Pulse 90   Temp 98.9  F (37.2  C) (Tympanic)   Resp 18   SpO2 98%      P: palpatory tenderness None reported:    A: static palpation demonstrates intersegmental asymmetry  , cervical, thoracic, pelvis  R: motion palpation notes restricted motion, C1 , C6 , T4 , T10 and Sacrum   T: muscle spasm at level(s): Upper trapezius bilaterally, rhomboids bilaterally, cervical paraspinal musculature bilaterally, left quadratus lumborum, left sacrotuberous ligament taut tender fibers:  Palpatory tenderness also noted gluteus medius bilaterally, piriformis bilaterally    Segmental spinal dysfunction/restrictions found at:  :  C1 Left rotation restricted and Right lateral flexion restricted  C6 Left lateral flexion restricted and Extension restriction  T4 Extension restriction  T10 Extension restriction  Sacrum Left lateral flexion restricted and Extension restriction.      Assessment: Patient continues to have pain.  Patient also currently pregnant.  Believe that pregnancy contributing to pain problems as seen.  Progress still expected although this will likely be slower.    Diagnoses:      1. Segmental and somatic dysfunction of cervical region    2. Cervicalgia    3. Segmental and somatic dysfunction of thoracic region    4. Pain in thoracic spine    5. Segmental and somatic dysfunction of sacral region    6. Left low back pain, unspecified chronicity, unspecified whether sciatica present        Patient's condition:  Patient had restrictions pre-manipulation and Symptoms come and go    Treatment effectiveness:  Post manipulation there is better intersegmental movement and Post manipulation the patient improves and then feels more restricted motion over time      Procedures:  CMT:  70018 Chiropractic manipulative treatment 3-4 regions performed   Cervical: Diversified, C1 , C6, Supine  Thoracic: Diversified, T4, T10, Prone  Pelvis: Drop Table and tresa basic, Sacrum , Prone long axis distraction    Modalities:  None performed this visit    Therapeutic procedures:  None  Deferred to physical therapy    Response to Treatment  Reduction in symptoms as reported by patient    Prognosis:  Good    Progress towards Goals: Patient is making progress towards the goal.   Reduce pain by 35-50%              Decrease positive ortho-neuro tests                 Patient will demonstrate effective use of home management strategies  New goal 12/9/2021-patient will report 50% reduction of radicular symptoms of the upper and lower extremities-cancel goal 1/27/2022  Recommendations:    Instructions:rest and continue with home exercises as directed by physical therapy    Follow-up:  Return to care in 1 week.

## 2022-02-18 NOTE — PROGRESS NOTES
[facility]    Outpatient Physical Therapy Progress Note  Patient: Jay Jay Jacobsen  : 1985    Beginning/End Dates of Reporting Period:  22 to 22    Referring Provider: Dr. Sullivan    Therapy Diagnosis: neck and shoulder tissue tension with radicular symptoms.      Client Self Report: Jay Jay reports no significant change in function, excited to have compression gloves fitted today. Continues to have variable symptoms and swelling in feet and hands.     Objective Measurements:  Objective Measure: pain  Details: no pain in feet, numbness in B calcanous  Objective Measure: ROM  Details: neutral B ankle dorsiflexion, WFL for inversion/eversion and plantarflexion  Objective Measure: strength   Details: B ankle dorsiflexion/plantarflexion 5/5, L ankle inversion/eversion 4-/5, R ankle inversion/eversion 4/5    Goals:  Goal Identifier Pain   Goal Description pt will report a 2/10 pain in neck and upper shoulders during ADL's to increase tolerance to daily tasks.    Target Date 22   Date Met      Progress (detail required for progress note): (P) Progressing, pt having about the same pain in upper arms and shoulder but significant decline in tissue tension     Goal Identifier ROM   Goal Description Pt will demonstrate 20 deg of B neck side bending to increase neck ROM for dressing and driving.    Target Date 22   Date Met  (P) 22   Progress (detail required for progress note): (P) MET, pt presents with normalized equal neck ROM     Goal Identifier Radicular symptoms   Goal Description Pt will report numbness/tingling intermittently to B elbows by the end of the day to increase dexterity in hands and improve sleeping tolerance   Target Date 22   Date Met      Progress (detail required for progress note): (P) Progressing, tingling better with bracing at wrists at night but having a  lot of swelling in hands. pt given compression gloves for hand to help with swelling.      Goal Identifier sleeping   Goal Description pt will report no sleep disturbances due to upper body pain or numbness for 1 week to progress towards prior sleep hygiene.    Target Date 04/13/22   Date Met      Progress (detail required for progress note): (P) Progressing, pt still waking up due to shoulder/neck pain but is getting less with modifications to types of pillows and setup.        Plan:  Continue therapy per current plan of care.    Discharge:  No

## 2022-02-22 ENCOUNTER — HOSPITAL ENCOUNTER (OUTPATIENT)
Dept: PHYSICAL THERAPY | Facility: OTHER | Age: 37
Setting detail: THERAPIES SERIES
End: 2022-02-22
Attending: NURSE PRACTITIONER
Payer: COMMERCIAL

## 2022-02-22 PROCEDURE — 97112 NEUROMUSCULAR REEDUCATION: CPT | Mod: GP

## 2022-02-22 PROCEDURE — 97140 MANUAL THERAPY 1/> REGIONS: CPT | Mod: GP

## 2022-02-22 PROCEDURE — 97110 THERAPEUTIC EXERCISES: CPT | Mod: GP

## 2022-02-24 ENCOUNTER — HOSPITAL ENCOUNTER (OUTPATIENT)
Dept: PHYSICAL THERAPY | Facility: OTHER | Age: 37
Setting detail: THERAPIES SERIES
End: 2022-02-24
Attending: NURSE PRACTITIONER
Payer: COMMERCIAL

## 2022-02-24 PROCEDURE — 97112 NEUROMUSCULAR REEDUCATION: CPT | Mod: GP

## 2022-02-24 PROCEDURE — 97110 THERAPEUTIC EXERCISES: CPT | Mod: GP

## 2022-02-25 ENCOUNTER — THERAPY VISIT (OUTPATIENT)
Dept: CHIROPRACTIC MEDICINE | Facility: OTHER | Age: 37
End: 2022-02-25
Attending: CHIROPRACTOR
Payer: COMMERCIAL

## 2022-02-25 VITALS
TEMPERATURE: 97.2 F | OXYGEN SATURATION: 98 % | RESPIRATION RATE: 18 BRPM | HEART RATE: 91 BPM | SYSTOLIC BLOOD PRESSURE: 132 MMHG | DIASTOLIC BLOOD PRESSURE: 80 MMHG

## 2022-02-25 DIAGNOSIS — M54.50 LEFT LOW BACK PAIN, UNSPECIFIED CHRONICITY, UNSPECIFIED WHETHER SCIATICA PRESENT: ICD-10-CM

## 2022-02-25 DIAGNOSIS — M99.02 SEGMENTAL AND SOMATIC DYSFUNCTION OF THORACIC REGION: ICD-10-CM

## 2022-02-25 DIAGNOSIS — M99.01 SEGMENTAL AND SOMATIC DYSFUNCTION OF CERVICAL REGION: Primary | ICD-10-CM

## 2022-02-25 DIAGNOSIS — M54.6 PAIN IN THORACIC SPINE: ICD-10-CM

## 2022-02-25 DIAGNOSIS — M99.04 SEGMENTAL AND SOMATIC DYSFUNCTION OF SACRAL REGION: ICD-10-CM

## 2022-02-25 DIAGNOSIS — M54.2 CERVICALGIA: ICD-10-CM

## 2022-02-25 PROCEDURE — 98941 CHIROPRACT MANJ 3-4 REGIONS: CPT | Mod: AT | Performed by: CHIROPRACTOR

## 2022-02-25 NOTE — PROGRESS NOTES
Neck is constant aching and sharp radiating into head causing headaches. Rates 6/10 W24 7/10. Using ice and heat it loosen it up a little bit.  Bilateral upper back is constant dull aching. Rates 2/10 W24 2/10.  Left lower back is constant dull aching. Rates 3/10 W24 3/10.  Yovany Montelongo on 2/25/2022 at 8:21 AM    Visit #:  15    Subjective:  Jay Jay Jacobsen is a 36 year old female who is seen in f/u up for:        Segmental and somatic dysfunction of cervical region  Cervicalgia  Segmental and somatic dysfunction of thoracic region  Pain in thoracic spine  Segmental and somatic dysfunction of sacral region  Left low back pain, unspecified chronicity, unspecified whether sciatica present.     Since last visit on 2/18/2022,  Jay Jay Jacobsen reports: neck pain increased, slept funny on Sunday? PT Tuesday , did not provide much improvement. Tension style headaches.   Deep tissue massage recently did not help, this normally provides relief of symptoms.     Decreased fruit intake, increased fat intake seems to be helping with swelling concerns.     Right heel symptoms are continuing.     Traveling to [place] next week.   Area of chief complaint:  Cervical :  Symptoms are graded at 6-7/10. The quality is described as achey, sharp.    Thoracic :  Symptoms are graded at 2/10. The quality is described as achey, dull.    Lumbar :  Symptoms are graded at 3/10. The quality is described as achey, dull.      (DVPRS) Pain Rating Score : Sometimes distracts me (W24 3/10) (02/25/22 0818)     Objective:  The following was observed:  /80 (BP Location: Right arm, Patient Position: Sitting)   Pulse 91   Temp 97.2  F (36.2  C) (Tympanic)   Resp 18   SpO2 98%      P: palpatory tenderness C1 right, C6 left, T3 left, T9 midline, right PSIS  A: static palpation demonstrates intersegmental asymmetry , cervical, thoracic, pelvis  R: motion palpation notes restricted motion, C1 , C6 , T3 , T9  and Sacrum   T: muscle  spasm at level(s): Right suboccipitals, upper trapezius bilaterally, quadratus lumborum bilaterally, right fibers sacrotuberous ligament:      Segmental spinal dysfunction/restrictions found at:  :  C1 Left rotation restricted and Right lateral flexion restricted  C6 Right rotation restricted, Left lateral flexion restricted and Extension restriction  T3 Left lateral flexion restricted and Extension restriction  T9 Extension restriction  Sacrum Right lateral flexion restricted and Extension restriction.      Assessment: Flareup of neck concerns.  Plan to follow-up with patient again in 2 weeks.    Diagnoses:      1. Segmental and somatic dysfunction of cervical region    2. Cervicalgia    3. Segmental and somatic dysfunction of thoracic region    4. Pain in thoracic spine    5. Segmental and somatic dysfunction of sacral region    6. Left low back pain, unspecified chronicity, unspecified whether sciatica present        Patient's condition:  Patient had restrictions pre-manipulation    Treatment effectiveness:  Post manipulation there is better intersegmental movement, Patient claims to feel looser post manipulation and Patient states that they feel much better post manipulation but they gradually tighten up over time      Procedures:  CMT:  46151 Chiropractic manipulative treatment 3-4 regions performed   Cervical: Diversified, C1 , C6, Supine  Thoracic: Diversified, T3, T9, Prone  Pelvis: tresa basic, Sacrum , Prone    Modalities:  None performed this visit    Therapeutic procedures:  None  Deferred to physical therapy    Response to Treatment  Reduction in symptoms as reported by patient    Prognosis: Good    Progress towards Goals: Patient is making progress towards the goal.   Reduce pain by 35-50%  Decrease positive ortho-neuro tests     Patient will demonstrate effective use of home management strategies  New goal 12/9/2021-patient will report 50% reduction of radicular symptoms of the upper and lower  extremities-cancel goal 1/27/2022    Recommendations:    Instructions:monitor symptoms    Follow-up:  Return to care in 2 weeks.

## 2022-03-08 ENCOUNTER — HOSPITAL ENCOUNTER (OUTPATIENT)
Dept: PHYSICAL THERAPY | Facility: OTHER | Age: 37
Setting detail: THERAPIES SERIES
End: 2022-03-08
Payer: COMMERCIAL

## 2022-03-08 PROCEDURE — 97140 MANUAL THERAPY 1/> REGIONS: CPT | Mod: GP

## 2022-03-08 PROCEDURE — 97110 THERAPEUTIC EXERCISES: CPT | Mod: GP

## 2022-03-08 PROCEDURE — 97112 NEUROMUSCULAR REEDUCATION: CPT | Mod: GP

## 2022-03-10 ENCOUNTER — HOSPITAL ENCOUNTER (OUTPATIENT)
Dept: PHYSICAL THERAPY | Facility: OTHER | Age: 37
Setting detail: THERAPIES SERIES
Discharge: HOME | End: 2022-03-10
Payer: COMMERCIAL

## 2022-03-10 PROCEDURE — 97110 THERAPEUTIC EXERCISES: CPT | Mod: GP

## 2022-03-10 PROCEDURE — 97112 NEUROMUSCULAR REEDUCATION: CPT | Mod: GP

## 2022-03-14 ENCOUNTER — THERAPY VISIT (OUTPATIENT)
Dept: CHIROPRACTIC MEDICINE | Facility: OTHER | Age: 37
End: 2022-03-14
Attending: CHIROPRACTOR
Payer: COMMERCIAL

## 2022-03-14 VITALS
DIASTOLIC BLOOD PRESSURE: 72 MMHG | SYSTOLIC BLOOD PRESSURE: 128 MMHG | OXYGEN SATURATION: 97 % | RESPIRATION RATE: 16 BRPM | HEART RATE: 91 BPM | TEMPERATURE: 97 F

## 2022-03-14 DIAGNOSIS — M99.04 SEGMENTAL AND SOMATIC DYSFUNCTION OF SACRAL REGION: ICD-10-CM

## 2022-03-14 DIAGNOSIS — M99.01 SEGMENTAL AND SOMATIC DYSFUNCTION OF CERVICAL REGION: Primary | ICD-10-CM

## 2022-03-14 DIAGNOSIS — M54.2 CERVICALGIA: ICD-10-CM

## 2022-03-14 DIAGNOSIS — M99.02 SEGMENTAL AND SOMATIC DYSFUNCTION OF THORACIC REGION: ICD-10-CM

## 2022-03-14 DIAGNOSIS — M54.6 PAIN IN THORACIC SPINE: ICD-10-CM

## 2022-03-14 PROCEDURE — 98941 CHIROPRACT MANJ 3-4 REGIONS: CPT | Mod: AT | Performed by: CHIROPRACTOR

## 2022-03-14 NOTE — PROGRESS NOTES
Neck is constantly dull ache. 5/10 W24 5/10. Right side upper back is constant sharp pinching. 2/10 W24 2/10. Bilateral lower back is constant tightness. 4/10 W24 4/10.  Marie Calix on 3/14/2022 at 8:16 AM    Reviewed by EW    Visit #:  16    Subjective:  Jay Jay Jacobsen is a 36 year old female who is seen in f/u up for:        Segmental and somatic dysfunction of cervical region  Cervicalgia  Segmental and somatic dysfunction of thoracic region  Pain in thoracic spine  Segmental and somatic dysfunction of sacral region.     Since last visit on 2/25/2022,  Jay Jay Jacobsen reports: Returns from trip last week to [place].  Was scheduled on Friday but unable to attend regularly scheduled appointment due to provider being unavailable.  Rescheduled for today.  Low back symptoms currently right-sided which seems to have shifted from left to right.  Continue to work with physical therapy with beneficial results.  Reports that pregnancy still going well and that baby's physician is getting to transition to headdown.  Unsure if this is causing aggravation to current symptoms.    (DVPRS) Pain Rating Score : Distracts me, can do usual activities (W24 4/10) (03/14/22 0814)     Objective:  The following was observed:  /72 (BP Location: Right arm, Patient Position: Sitting, Cuff Size: Adult Regular)   Pulse 91   Temp 97  F (36.1  C) (Tympanic)   Resp 16   SpO2 97%      P: palpatory tenderness C1 right, C5 left, CT junction bilaterally, T9 midline, right PSIS:    A: static palpation demonstrates intersegmental asymmetry , cervical, thoracic, pelvis  R: motion palpation notes restricted motion, C1 , C5 , T1 , T2 , T9  and Sacrum   T: muscle spasm at level(s):  Moderate of the quadratus lumborum bilaterally left greater than right, mild of the upper trapezius and rhomboids bilaterally, hypertonicity noted suboccipitals on right side:      Segmental spinal dysfunction/restrictions found at:  :  C1 Left  rotation restricted and Right lateral flexion restricted  C5 Right rotation restricted and Extension restriction  T1 Right lateral flexion restricted and Extension restriction  T2 Left lateral flexion restricted and Extension restriction  T9 Extension restriction  Sacrum Extension restriction.      Assessment: Patient still finds temporary relief with chiropractic care and physical therapy.  Symptoms continue to remain quite elevated.  Patient is approximately 29/30 weeks pregnant at time of appointment.  Continue to believe that patient's pregnancy major contributing factor for symptoms.  Symptoms likely to continue until patient gives birth.  Expect that time away from care would likely cause symptoms to further worsen resulting in return of radicular symptoms of the upper extremities and lower extremities seen prior to intervention with cotreatment by chiropractic and physical therapy.    Diagnoses:      1. Segmental and somatic dysfunction of cervical region    2. Cervicalgia    3. Segmental and somatic dysfunction of thoracic region    4. Pain in thoracic spine    5. Segmental and somatic dysfunction of sacral region        Patient's condition:  Patient had restrictions pre-manipulation and Symptoms come and go    Treatment effectiveness:  Post manipulation there is better intersegmental movement and Patient claims to feel looser post manipulation      Procedures:  CMT:  62316 Chiropractic manipulative treatment 3-4 regions performed   Cervical: Diversified, C1 , C5 , Supine  Thoracic: Diversified, T1, T2, T9, Prone  Pelvis: Diversified and long axis distraction, Sacrum , Prone    Modalities:  None performed this visit    Therapeutic procedures:  None  Deferred to physical therapy    Response to Treatment  Reduction in symptoms as reported by patient    Prognosis: Good    Progress towards Goals: In progress  Reduce pain by 35-50%  Decrease positive ortho-neuro tests     Patient will demonstrate effective use of  home management strategies    Recommendations:    Instructions:Monitor symptoms and perform activities as tolerated    Follow-up:  Return to care in 1 week.

## 2022-03-15 ENCOUNTER — HOSPITAL ENCOUNTER (OUTPATIENT)
Dept: PHYSICAL THERAPY | Facility: OTHER | Age: 37
Setting detail: THERAPIES SERIES
Discharge: HOME | End: 2022-03-15
Payer: COMMERCIAL

## 2022-03-15 PROCEDURE — 97110 THERAPEUTIC EXERCISES: CPT | Mod: GP

## 2022-03-15 PROCEDURE — 97112 NEUROMUSCULAR REEDUCATION: CPT | Mod: GP

## 2022-03-22 ENCOUNTER — HOSPITAL ENCOUNTER (OUTPATIENT)
Dept: PHYSICAL THERAPY | Facility: OTHER | Age: 37
Setting detail: THERAPIES SERIES
Discharge: HOME | End: 2022-03-22
Payer: COMMERCIAL

## 2022-03-22 PROCEDURE — 97112 NEUROMUSCULAR REEDUCATION: CPT | Mod: GP

## 2022-03-22 PROCEDURE — 97110 THERAPEUTIC EXERCISES: CPT | Mod: GP

## 2022-03-23 ENCOUNTER — THERAPY VISIT (OUTPATIENT)
Dept: CHIROPRACTIC MEDICINE | Facility: OTHER | Age: 37
End: 2022-03-23
Attending: CHIROPRACTOR
Payer: COMMERCIAL

## 2022-03-23 VITALS
OXYGEN SATURATION: 97 % | SYSTOLIC BLOOD PRESSURE: 128 MMHG | RESPIRATION RATE: 16 BRPM | HEART RATE: 84 BPM | DIASTOLIC BLOOD PRESSURE: 72 MMHG | TEMPERATURE: 98 F

## 2022-03-23 DIAGNOSIS — M54.2 CERVICALGIA: ICD-10-CM

## 2022-03-23 DIAGNOSIS — M99.02 SEGMENTAL AND SOMATIC DYSFUNCTION OF THORACIC REGION: ICD-10-CM

## 2022-03-23 DIAGNOSIS — M99.04 SEGMENTAL AND SOMATIC DYSFUNCTION OF SACRAL REGION: ICD-10-CM

## 2022-03-23 DIAGNOSIS — M25.532 PAIN IN BOTH WRISTS: ICD-10-CM

## 2022-03-23 DIAGNOSIS — M54.50 LEFT LOW BACK PAIN, UNSPECIFIED CHRONICITY, UNSPECIFIED WHETHER SCIATICA PRESENT: ICD-10-CM

## 2022-03-23 DIAGNOSIS — M99.01 SEGMENTAL AND SOMATIC DYSFUNCTION OF CERVICAL REGION: Primary | ICD-10-CM

## 2022-03-23 DIAGNOSIS — M54.6 PAIN IN THORACIC SPINE: ICD-10-CM

## 2022-03-23 DIAGNOSIS — M25.531 PAIN IN BOTH WRISTS: ICD-10-CM

## 2022-03-23 PROCEDURE — 98941 CHIROPRACT MANJ 3-4 REGIONS: CPT | Mod: AT | Performed by: CHIROPRACTOR

## 2022-03-23 NOTE — PROGRESS NOTES
Neck is constant tight pinching. 4/10 W24 4/10. Left upper back is constant pinching. 3/10 W24 3/10. Bilateral lower back is constant dull ache. 2/10 W24 2/10.  Marie Calix on 3/23/2022 at 8:13 AM    Reviewed by EW    Visit #:  17    Subjective:  Jay Jay Jacobsen is a 36 year old female who is seen in f/u up for:        Segmental and somatic dysfunction of cervical region  Segmental and somatic dysfunction of thoracic region  Segmental and somatic dysfunction of sacral region  Cervicalgia  Pain in thoracic spine  Left low back pain, unspecified chronicity, unspecified whether sciatica present  Pain in both wrists.     Since last visit on 3/14/2022,  Jay Jay Jacobsen reports: Symptoms continue to be problematic.  Patient continues with physical therapy which also seems to provide some level of help regarding neck and back pain symptoms.  New wrist symptoms worsening along anatomical snuffbox, etiology not clear.  Patient has had some hand related symptoms during course of pregnancy.  Currently hands are somewhat puffy looking.    (DVPRS) Pain Rating Score : Notice pain, does not interfere with activities (W24 2/10) (03/23/22 0812)     Objective:  The following was observed:  /72 (BP Location: Right arm, Patient Position: Sitting, Cuff Size: Adult Regular)   Pulse 84   Temp 98  F (36.7  C) (Tympanic)   Resp 16   SpO2 97%      P: palpatory tendernessLeft sacrotuberous ligament, right round ligament, T6 midline, T2 midline, C1 on right:  Scaphoid bilaterally  A: static palpation demonstrates intersegmental asymmetry , cervical, thoracic, pelvis  R: motion palpation notes restricted motion, C1 , T2 , T6 , Sacrum  and Scaphoid bilaterally  T: muscle spasm at level(s): Upper trapezius and rhomboids bilaterally, right suboccipitals, taut tender fibers apparent left sacrotuberous ligament, right round ligament:      Segmental spinal dysfunction/restrictions found at:  :  C1 Left rotation restricted and  Right lateral flexion restricted  T2 Extension restriction  T6 Extension restriction  Sacrum Left lateral flexion restricted and Extension restriction.      Assessment: Symptoms are doing well.  Trial use of Colmenares's technique to help with patient's symptoms at this stage in her pregnancy.  Suspect that patient symptoms will continue to be problematic until patient gives birth.  Continue at once a week care at this time.    Risk concerns did show mild restriction of the scaphoid.  Passive mobilization was performed which was not chiropractic manipulative therapy.    Diagnoses:      1. Segmental and somatic dysfunction of cervical region    2. Segmental and somatic dysfunction of thoracic region    3. Segmental and somatic dysfunction of sacral region    4. Cervicalgia    5. Pain in thoracic spine    6. Left low back pain, unspecified chronicity, unspecified whether sciatica present    7. Pain in both wrists        Patient's condition:  Patient had restrictions pre-manipulation and Symptoms come and go    Treatment effectiveness:  Post manipulation there is better intersegmental movement and Patient states that they feel much better post manipulation but they gradually tighten up over time      Procedures:  CMT:  28879 Chiropractic manipulative treatment 3-4 regions performed   Cervical: Diversified, C1 , Supine  Thoracic: Diversified, T2, T6, Prone  Pelvis: Colmenares technique, Sacrum , Prone, Supine long axis distraction    Modalities:  Passive modalities stretching right scaphoid    Therapeutic procedures:  None  Deferred to physical therapy    Response to Treatment  Reduction in symptoms as reported by patient    Prognosis: Good    Progress towards Goals: Patient is making progress towards the goal.   Reduce pain by 35-50%  Decrease positive ortho-neuro tests     Patient will demonstrate effective use of home management strategies    Recommendations:    Instructions:rest    Follow-up:  Return to care in 1 week.

## 2022-03-24 ENCOUNTER — HOSPITAL ENCOUNTER (OUTPATIENT)
Dept: PHYSICAL THERAPY | Facility: OTHER | Age: 37
Setting detail: THERAPIES SERIES
Discharge: HOME | End: 2022-03-24
Payer: COMMERCIAL

## 2022-03-24 PROCEDURE — 97110 THERAPEUTIC EXERCISES: CPT | Mod: GP

## 2022-03-24 PROCEDURE — 97112 NEUROMUSCULAR REEDUCATION: CPT | Mod: GP

## 2022-03-29 ENCOUNTER — HOSPITAL ENCOUNTER (OUTPATIENT)
Dept: PHYSICAL THERAPY | Facility: OTHER | Age: 37
Setting detail: THERAPIES SERIES
Discharge: HOME | End: 2022-03-29
Payer: COMMERCIAL

## 2022-03-29 PROCEDURE — 97112 NEUROMUSCULAR REEDUCATION: CPT | Mod: GP

## 2022-03-31 ENCOUNTER — HOSPITAL ENCOUNTER (OUTPATIENT)
Dept: PHYSICAL THERAPY | Facility: OTHER | Age: 37
Setting detail: THERAPIES SERIES
Discharge: HOME | End: 2022-03-31
Payer: COMMERCIAL

## 2022-03-31 PROCEDURE — 97140 MANUAL THERAPY 1/> REGIONS: CPT | Mod: GP

## 2022-03-31 PROCEDURE — 97110 THERAPEUTIC EXERCISES: CPT | Mod: GP

## 2022-03-31 NOTE — PROGRESS NOTES
[facility]    Outpatient Physical Therapy Progress Note  Patient: Jay Jay Jacobsen  : 1985    Beginning/End Dates of Reporting Period:  22 to 3/31/22    Referring Provider: Dr. Sullivan    Therapy Diagnosis: neck and shoulder tissue tension with radicular symptoms.      Client Self Report: Pt reports she is feeling better today, foot is starting to feel more pain vs numbness and has maintained function well.     Objective Measurements:  Objective Measure: pain  Details: no pain in feet, numbness in B calcanous  Objective Measure: ROM    Goals:  Goal Identifier Pain   Goal Description pt will report a 2/10 pain in neck and upper shoulders during ADL's to increase tolerance to daily tasks.    Target Date 22   Date Met      Progress (detail required for progress note): Pt reports pain is about the same but has not gotten worse with progression of swelling and pregnancy indicating progression     Goal Identifier ROM   Goal Description Pt will demonstrate 20 deg of B neck side bending to increase neck ROM for dressing and driving.    Target Date 22   Date Met  22   Progress (detail required for progress note): MET, pt presents with normalized equal neck ROM     Goal Identifier Radicular symptoms   Goal Description Pt will report numbness/tingling intermittently to B elbows by the end of the day to increase dexterity in hands and improve sleeping tolerance   Target Date 22   Date Met      Progress (detail required for progress note): Progressing, swelling under control but radicular symptoms about the same, has not gotten worse with progression of pregnancy.      Goal Identifier sleeping   Goal Description pt will report no sleep disturbances due to upper body pain or numbness for 1 week to progress towards prior sleep hygiene.    Target Date 22   Date Met      Progress  (detail required for progress note): Progressing, pt still waking up due to shoulder/neck pain but is getting less with modifications to types of pillows and setup.        Plan:  Continue therapy per current plan of care.    Discharge:  No

## 2022-04-05 ENCOUNTER — HOSPITAL ENCOUNTER (OUTPATIENT)
Dept: PHYSICAL THERAPY | Facility: OTHER | Age: 37
Setting detail: THERAPIES SERIES
Discharge: HOME | End: 2022-04-05
Payer: COMMERCIAL

## 2022-04-05 PROCEDURE — 97110 THERAPEUTIC EXERCISES: CPT | Mod: GP

## 2022-04-05 PROCEDURE — 97112 NEUROMUSCULAR REEDUCATION: CPT | Mod: GP

## 2022-04-06 ENCOUNTER — THERAPY VISIT (OUTPATIENT)
Dept: CHIROPRACTIC MEDICINE | Facility: OTHER | Age: 37
End: 2022-04-06
Attending: CHIROPRACTOR
Payer: COMMERCIAL

## 2022-04-06 VITALS
SYSTOLIC BLOOD PRESSURE: 128 MMHG | TEMPERATURE: 97.8 F | OXYGEN SATURATION: 98 % | HEART RATE: 88 BPM | DIASTOLIC BLOOD PRESSURE: 74 MMHG | RESPIRATION RATE: 16 BRPM

## 2022-04-06 DIAGNOSIS — M99.01 SEGMENTAL AND SOMATIC DYSFUNCTION OF CERVICAL REGION: Primary | ICD-10-CM

## 2022-04-06 DIAGNOSIS — M25.531 PAIN IN BOTH WRISTS: ICD-10-CM

## 2022-04-06 DIAGNOSIS — M25.532 PAIN IN BOTH WRISTS: ICD-10-CM

## 2022-04-06 DIAGNOSIS — M54.6 PAIN IN THORACIC SPINE: ICD-10-CM

## 2022-04-06 DIAGNOSIS — M99.04 SEGMENTAL AND SOMATIC DYSFUNCTION OF SACRAL REGION: ICD-10-CM

## 2022-04-06 DIAGNOSIS — M54.50 BILATERAL LOW BACK PAIN, UNSPECIFIED CHRONICITY, UNSPECIFIED WHETHER SCIATICA PRESENT: ICD-10-CM

## 2022-04-06 DIAGNOSIS — M99.02 SEGMENTAL AND SOMATIC DYSFUNCTION OF THORACIC REGION: ICD-10-CM

## 2022-04-06 DIAGNOSIS — M54.2 CERVICALGIA: ICD-10-CM

## 2022-04-06 PROCEDURE — 98941 CHIROPRACT MANJ 3-4 REGIONS: CPT | Mod: AT | Performed by: CHIROPRACTOR

## 2022-04-06 NOTE — PROGRESS NOTES
"Neck is constant dull ache. 3/10 W24 3/10. Left upper back is constant pinching. 3/10 W24 3/10. Bilateral lower back is constant aching. 4/10 W24 4/10.  Marie Calix on 4/6/2022 at 8:10 AM    Reviewed by EW    Visit #:  18    Subjective:  Jay Jay Jacobsen is a 36 year old female who is seen in f/u up for:        Segmental and somatic dysfunction of cervical region  Segmental and somatic dysfunction of thoracic region  Segmental and somatic dysfunction of sacral region  Cervicalgia  Pain in thoracic spine  Bilateral low back pain, unspecified chronicity, unspecified whether sciatica present  Pain in both wrists.     Since last visit on 3/23/2022,  Jay Jay Jacobsen reports: At this time patient symptoms seem to be doing somewhat better.  Believes that it is due to her current third trimester state of pregnancy.  Physical therapy continues to be beneficial.  Patient notes that she will continue course of treatment until roughly May when therapist leaves on a trip.  Reports focusing on squatting form.  Has been noticing more pain into the right heel.  Concerned about a possible \"heel crack. \"Majority of symptoms are located along mid back/rib cage especially when she is sitting to drive.    Wrists also painful, trying home stretching recommendations seem to provide some level of help.    (DVPRS) Pain Rating Score : Distracts me, can do usual activities (W24 4/10) (04/06/22 0809)     Objective:  The following was observed:  /74 (BP Location: Right arm, Patient Position: Sitting, Cuff Size: Adult Regular)   Pulse 88   Temp 97.8  F (36.6  C) (Tympanic)   Resp 16   SpO2 98%    Neck Disability Index (  [employer]. 1991. All rights reserved.; used with permission) 4/6/2022   SECTION 1 - PAIN INTENSITY 2   SECTION 2 - PERSONAL CARE 0   SECTION 3 - LIFTING 0   SECTION 4 - READING 0   SECTION 5 - HEADACHES 0   SECTION 6 - CONCENTRATION 0   SECTION 7 - WORK 1   SECTION 8 - DRIVING 1   SECTION 9 - " SLEEPING 0   SECTION 10 - RECREATION 2   Count 10   Sum 6   Raw Score: /50 6   Neck Disability Index Score: (%) 12     Improved from 20%    Oswestry (JEANETTE) Questionnaire    OSWESTRY DISABILITY INDEX 4/6/2022   Count 9   Sum 2   Oswestry Score (%) 4.44   Some recent data might be hidden      Improved from 6.66%    P: palpatory tenderness Right side C1, left side C5, right thoracic paraspinal musculature, left sacrotuberous ligament, right round ligament:    A: static palpation demonstrates intersegmental asymmetry , cervical, thoracic, pelvis  R: motion palpation notes restricted motion, C1 , C5 , T3 , T8 , T12  and Sacrum   T: muscle spasm at level(s): Right T-spine paraspinals and right latissimus dorsi, taut and tender fibers noted cervical paraspinals bilaterally, taut and tender fibers noted left sacrotuberous ligament taut tender fibers also noted right round ligament:      Segmental spinal dysfunction/restrictions found at:  :  C1 Left rotation restricted and Right lateral flexion restricted  C5 Right rotation restricted, Left lateral flexion restricted and Extension restriction  T3 Right lateral flexion restricted and Extension restriction  T8 Left lateral flexion restricted and Extension restriction  T12 Right lateral flexion restricted and Extension restriction  Sacrum Left lateral flexion restricted.      Assessment: Patient does continue to show signs of progress.  This is lower than originally anticipated.  Continue to believe that patient's pregnancy is a major contributing factor to symptoms that we are seeing and likely will not resolve until patient gives birth.  Continue to follow-up with patient on once a week basis for treating and coordination of care to be done with physical therapy at [facility].    Diagnoses:      1. Segmental and somatic dysfunction of cervical region    2. Segmental and somatic dysfunction of thoracic region    3. Segmental and somatic dysfunction of sacral region    4.  Cervicalgia    5. Pain in thoracic spine    6. Bilateral low back pain, unspecified chronicity, unspecified whether sciatica present    7. Pain in both wrists        Patient's condition:  Patient had restrictions pre-manipulation and Symptoms come and go    Treatment effectiveness:  Post manipulation there is better intersegmental movement and Patients symptoms are getting better      Procedures:  [facility]:  [id number] Chiropractic manipulative treatment 3-4 regions performed   Cervical: Diversified, C1 , C5 , Supine  Thoracic: Diversified, T3, T8, T12, Prone  Pelvis: Colmenares  technique, Sacrum , Prone, Supine    Modalities:  None performed this visit  Passive mobilization of the scaphoid    Therapeutic procedures:  None  Deferred to physical therapy    Response to Treatment  Reduction in symptoms as reported by patient    Prognosis: Good    Progress towards Goals: in progress  Reduce pain by 35-50%  Decrease positive ortho-neuro tests     Patient will demonstrate effective use of home management strategies  New goal 4/6/2022-reduce neck disability index score additional 10%    Recommendations:    Instructions:monitor symptoms    Follow-up:  Return to care in 1 week.

## 2022-04-08 ENCOUNTER — HOSPITAL ENCOUNTER (OUTPATIENT)
Dept: PHYSICAL THERAPY | Facility: OTHER | Age: 37
Setting detail: THERAPIES SERIES
Discharge: HOME | End: 2022-04-08
Payer: COMMERCIAL

## 2022-04-08 PROCEDURE — 97110 THERAPEUTIC EXERCISES: CPT | Mod: GP

## 2022-04-08 PROCEDURE — 97140 MANUAL THERAPY 1/> REGIONS: CPT | Mod: GP

## 2022-04-12 ENCOUNTER — HOSPITAL ENCOUNTER (OUTPATIENT)
Dept: PHYSICAL THERAPY | Facility: OTHER | Age: 37
Setting detail: THERAPIES SERIES
Discharge: HOME | End: 2022-04-12
Payer: COMMERCIAL

## 2022-04-12 PROCEDURE — 97110 THERAPEUTIC EXERCISES: CPT | Mod: GP

## 2022-04-12 PROCEDURE — 97140 MANUAL THERAPY 1/> REGIONS: CPT | Mod: GP

## 2022-04-14 ENCOUNTER — HOSPITAL ENCOUNTER (OUTPATIENT)
Dept: PHYSICAL THERAPY | Facility: OTHER | Age: 37
Setting detail: THERAPIES SERIES
Discharge: HOME | End: 2022-04-14
Payer: COMMERCIAL

## 2022-04-14 PROCEDURE — 97112 NEUROMUSCULAR REEDUCATION: CPT | Mod: GP

## 2022-04-14 PROCEDURE — 97110 THERAPEUTIC EXERCISES: CPT | Mod: GP

## 2022-04-19 ENCOUNTER — HOSPITAL ENCOUNTER (OUTPATIENT)
Dept: PHYSICAL THERAPY | Facility: OTHER | Age: 37
Setting detail: THERAPIES SERIES
Discharge: HOME | End: 2022-04-19
Payer: COMMERCIAL

## 2022-04-19 PROCEDURE — 97110 THERAPEUTIC EXERCISES: CPT | Mod: GP

## 2022-04-20 ENCOUNTER — THERAPY VISIT (OUTPATIENT)
Dept: CHIROPRACTIC MEDICINE | Facility: OTHER | Age: 37
End: 2022-04-20
Attending: CHIROPRACTOR
Payer: COMMERCIAL

## 2022-04-20 VITALS
TEMPERATURE: 98.2 F | DIASTOLIC BLOOD PRESSURE: 70 MMHG | SYSTOLIC BLOOD PRESSURE: 118 MMHG | OXYGEN SATURATION: 98 % | RESPIRATION RATE: 16 BRPM | HEART RATE: 88 BPM

## 2022-04-20 DIAGNOSIS — M25.531 PAIN IN BOTH WRISTS: ICD-10-CM

## 2022-04-20 DIAGNOSIS — M54.50 BILATERAL LOW BACK PAIN, UNSPECIFIED CHRONICITY, UNSPECIFIED WHETHER SCIATICA PRESENT: ICD-10-CM

## 2022-04-20 DIAGNOSIS — M54.2 CERVICALGIA: ICD-10-CM

## 2022-04-20 DIAGNOSIS — M99.02 SEGMENTAL AND SOMATIC DYSFUNCTION OF THORACIC REGION: ICD-10-CM

## 2022-04-20 DIAGNOSIS — M99.01 SEGMENTAL AND SOMATIC DYSFUNCTION OF CERVICAL REGION: Primary | ICD-10-CM

## 2022-04-20 DIAGNOSIS — M25.532 PAIN IN BOTH WRISTS: ICD-10-CM

## 2022-04-20 DIAGNOSIS — M54.6 PAIN IN THORACIC SPINE: ICD-10-CM

## 2022-04-20 DIAGNOSIS — M99.04 SEGMENTAL AND SOMATIC DYSFUNCTION OF SACRAL REGION: ICD-10-CM

## 2022-04-20 PROCEDURE — 98941 CHIROPRACT MANJ 3-4 REGIONS: CPT | Mod: AT | Performed by: CHIROPRACTOR

## 2022-04-20 NOTE — PROGRESS NOTES
Neck is constant dull aching. Radiating up into head. Having headaches. 5/10 W24 5/10. Left upper back is constant sharp. 3/10 W24 3/10.  Bilateral lower back is constant pulling. Left side is worse. 2/10 W24 2/10.  Marie Calix on 4/20/2022 at 8:14 AM    Reviewed by EW    Visit #:  19    Subjective:  Jay Jay Jacobsen is a 36 year old female who is seen in f/u up for:        Segmental and somatic dysfunction of cervical region  Segmental and somatic dysfunction of thoracic region  Segmental and somatic dysfunction of sacral region  Cervicalgia  Pain in thoracic spine  Bilateral low back pain, unspecified chronicity, unspecified whether sciatica present  Pain in both wrists.     Since last visit on 4/6/2022,  Jay Jay Jacobsen reports:  Lower back doing well, PT with lots of squats has been helpful. Primary concerns of the neck and upper back.   Left neck/CT upper back no radicular symptoms.     34 weeks pregnant.    Ankles doing well wrists still painful    (DVPRS) Pain Rating Score : Notice pain, does not interfere with activities (W24 2/10) (04/20/22 0813)     Objective:  The following was observed:  /70 (BP Location: Right arm, Patient Position: Sitting, Cuff Size: Adult Regular)   Pulse 88   Temp 98.2  F (36.8  C) (Tympanic)   Resp 16   SpO2 98%      P: palpatory tenderness Left side C7, T6 left, sacrotuberous ligaments bilaterally:    A: static palpation demonstrates intersegmental asymmetry , cervical, thoracic, pelvis  R: motion palpation notes restricted motion, C1 , C7 , T6  and Sacrum   T: muscle spasm at level(s): Left levator scapula, upper trapezius left greater than right, right suboccipitals, taut tender fibers apparent sacrotuberous ligaments bilaterally, right round ligament:      Segmental spinal dysfunction/restrictions found at:  :  C1 Left rotation restricted and Right lateral flexion restricted  C7 Right rotation restricted, Left lateral flexion restricted and Extension  restriction  T6 Left lateral flexion restricted and Extension restriction  Sacrum Left lateral flexion restricted and Extension restriction.      Assessment: Symptoms are showing good signs of progress through cotreatment of chiropractic and physical therapy.  Suspect that symptoms will likely continue to be present until patient gives birth.  Continue to provide supportive care at this time    Diagnoses:      1. Segmental and somatic dysfunction of cervical region    2. Segmental and somatic dysfunction of thoracic region    3. Segmental and somatic dysfunction of sacral region    4. Cervicalgia    5. Pain in thoracic spine    6. Bilateral low back pain, unspecified chronicity, unspecified whether sciatica present    7. Pain in both wrists        Patient's condition:  Patient had restrictions pre-manipulation    Treatment effectiveness:  Post manipulation there is better intersegmental movement and Patient claims to feel looser post manipulation      Procedures:  CMT:  90798 Chiropractic manipulative treatment 3-4 regions performed   Cervical: Diversified, C1 , C7 , Supine  Thoracic: Diversified, T6, Prone  Pelvis: Websters technique, Sacrum , Prone, Supine    Modalities:  None performed this visit    Therapeutic procedures:  None    Response to Treatment  Reduction in symptoms as reported by patient    Prognosis: Good    Progress towards Goals: Patient is making progress towards the goal.   Reduce pain by 35-50%  Decrease positive ortho-neuro tests     Patient will demonstrate effective use of home management strategies  New goal 4/6/2022-reduce neck disability index score additional 10%    Recommendations:    Instructions:Continue with home exercises from physical therapy    Follow-up:  Continue treatment PRN.

## 2022-04-21 ENCOUNTER — HOSPITAL ENCOUNTER (OUTPATIENT)
Dept: PHYSICAL THERAPY | Facility: OTHER | Age: 37
Setting detail: THERAPIES SERIES
Discharge: HOME | End: 2022-04-21
Payer: COMMERCIAL

## 2022-04-21 PROCEDURE — 97110 THERAPEUTIC EXERCISES: CPT | Mod: GP

## 2022-04-26 ENCOUNTER — HOSPITAL ENCOUNTER (OUTPATIENT)
Dept: PHYSICAL THERAPY | Facility: OTHER | Age: 37
Setting detail: THERAPIES SERIES
Discharge: HOME | End: 2022-04-26
Payer: COMMERCIAL

## 2022-04-26 PROCEDURE — 97110 THERAPEUTIC EXERCISES: CPT | Mod: GP

## 2022-04-28 ENCOUNTER — HOSPITAL ENCOUNTER (OUTPATIENT)
Dept: PHYSICAL THERAPY | Facility: OTHER | Age: 37
Setting detail: THERAPIES SERIES
Discharge: HOME | End: 2022-04-28
Payer: COMMERCIAL

## 2022-04-28 PROCEDURE — 97110 THERAPEUTIC EXERCISES: CPT | Mod: GP

## 2022-05-04 ENCOUNTER — THERAPY VISIT (OUTPATIENT)
Dept: CHIROPRACTIC MEDICINE | Facility: OTHER | Age: 37
End: 2022-05-04
Attending: CHIROPRACTOR
Payer: COMMERCIAL

## 2022-05-04 VITALS
TEMPERATURE: 97.8 F | OXYGEN SATURATION: 98 % | DIASTOLIC BLOOD PRESSURE: 72 MMHG | RESPIRATION RATE: 16 BRPM | SYSTOLIC BLOOD PRESSURE: 122 MMHG | HEART RATE: 76 BPM

## 2022-05-04 DIAGNOSIS — M99.05 SEGMENTAL AND SOMATIC DYSFUNCTION OF PELVIC REGION: ICD-10-CM

## 2022-05-04 DIAGNOSIS — M99.01 SEGMENTAL AND SOMATIC DYSFUNCTION OF CERVICAL REGION: Primary | ICD-10-CM

## 2022-05-04 DIAGNOSIS — M54.2 CERVICALGIA: ICD-10-CM

## 2022-05-04 DIAGNOSIS — M54.50 BILATERAL LOW BACK PAIN, UNSPECIFIED CHRONICITY, UNSPECIFIED WHETHER SCIATICA PRESENT: ICD-10-CM

## 2022-05-04 PROCEDURE — 98940 CHIROPRACT MANJ 1-2 REGIONS: CPT | Mod: AT | Performed by: CHIROPRACTOR

## 2022-05-04 NOTE — PROGRESS NOTES
Neck is intermittent sore. 2/10 W24 2/10. Bilateral upper back is constant aching. 4/10 W24 4/10. Bilateral lower back is constant tight, pulling, and pinching. 5/10 W24 5/10. Doing stretches these are helpful to loosen things up.  Marie Calix on 5/4/2022 at 8:12 AM     Reviewed by EW    Visit #:  20    Subjective:  Jay Jay Jacobsen is a 36 year old female who is seen in f/u up for:        Segmental and somatic dysfunction of cervical region  Segmental and somatic dysfunction of pelvic region  Cervicalgia  Bilateral low back pain, unspecified chronicity, unspecified whether sciatica present.     Since last visit on 4/20/2022,  Jay Jay Jacobsen reports:  Patient is 36 weeks gestation. Baby is head down and facing right hip. Patient reports feeling really well. Hand symptoms continue. Reports gloves/splints aren't making much of a difference. Right elbow is painful, this is a new symptom. Writing is difficult due to patient's symptoms of her hands.  Heels feeling much better.    Pain feels ligamentous today. Patient is waking up occasional, roughly every 2hrs,  due to pain. But sleep has been pretty good.    Continues to do home PT/squatting which helps immensely.  Reports that overall flexibility and range of motion seems to be the best that it has ever been.  Patient was very pleased about this as well       (DVPRS) Pain Rating Score : Interrupts some activities (W24 5/10) (05/04/22 0809)     Objective:  The following was observed:  /72 (BP Location: Right arm, Patient Position: Sitting, Cuff Size: Adult Regular)   Pulse 76   Temp 97.8  F (36.6  C) (Tympanic)   Resp 16   SpO2 98%    Neck Disability Index (  [employer]. [employer]. 1991. All rights reserved.; used with permission) 5/4/2022   SECTION 1 - PAIN INTENSITY 1   SECTION 2 - PERSONAL CARE 0   SECTION 3 - LIFTING 0   SECTION 4 - READING 0   SECTION 5 - HEADACHES 1   SECTION 6 - CONCENTRATION 0   SECTION 7 - WORK 0   SECTION 8 - DRIVING 0    SECTION 9 - SLEEPING 0   SECTION 10 - RECREATION 1   Count 10   Sum 3   Raw Score: /50 3   Neck Disability Index Score: (%) 6     Improved from 12%    Oswestry (JEANETTE) Questionnaire    OSWESTRY DISABILITY INDEX 5/4/2022   Count 9   Sum 4   Oswestry Score (%) 8.89   Some recent data might be hidden      Increased from 4.44%    P: palpatory tendernessLeft sacrotuberous ligament, right round ligament mild, C6/7:    A: static palpation demonstrates intersegmental asymmetry , cervical, pelvis  R: motion palpation notes restricted motion, C6  and C7 left ilium  T: Mild spasms paraspinal musculature CT junction bilaterally, levator scapula bilaterally, taut tender fibers noted left sacrotuberous ligament, right round ligament    Segmental spinal dysfunction/restrictions found at:  :  C6 Right lateral flexion restricted and Extension restriction  C7 Left lateral flexion restricted and Extension restriction  PSIS Left PI listing.      Assessment: Symptoms are continue to show improvement.  Patient symptoms most likely secondary to pregnancy and will continue to be problematic until patient gives birth.  We will continue to provide supportive care for patient.    Diagnoses:      1. Segmental and somatic dysfunction of cervical region    2. Segmental and somatic dysfunction of pelvic region    3. Cervicalgia    4. Bilateral low back pain, unspecified chronicity, unspecified whether sciatica present        Patient's condition:  Patient had restrictions pre-manipulation and Patient symptoms are gradually improving    Treatment effectiveness:  Post manipulation there is better intersegmental movement, Patient claims to feel looser post manipulation, Patients symptoms are getting better, Tenderness is decreasing and Range of motion is gradually improving      Procedures:  CMT:  22645 Chiropractic manipulative treatment 1-2 regions performed   Cervical: Diversified, C6, C7 , Supine  Pelvis: Colmenares, PSIS Left , Prone,  Supine    Modalities:  None performed this visit    Therapeutic procedures:  None  Deferred to PT    Response to Treatment  Reduction in symptoms as reported by patient    Prognosis: Good    Progress towards Goals: in progress  Reduce pain by 35-50%  Decrease positive ortho-neuro tests     Patient will demonstrate effective use of home management strategies  New goal 4/6/2022-reduce neck disability index score additional 10%    Recommendations:    Instructions:Continue with home exercises per physical therapy    Follow-up:  Continue treatment PRN.

## 2022-05-19 ENCOUNTER — HOSPITAL ENCOUNTER (OUTPATIENT)
Dept: PHYSICAL THERAPY | Facility: OTHER | Age: 37
Setting detail: THERAPIES SERIES
Discharge: HOME | End: 2022-05-19
Payer: COMMERCIAL

## 2022-05-19 PROCEDURE — 97110 THERAPEUTIC EXERCISES: CPT | Mod: GP

## 2022-05-21 ENCOUNTER — HEALTH MAINTENANCE LETTER (OUTPATIENT)
Age: 37
End: 2022-05-21

## 2022-05-25 ENCOUNTER — HOSPITAL ENCOUNTER (OUTPATIENT)
Dept: PHYSICAL THERAPY | Facility: OTHER | Age: 37
Setting detail: THERAPIES SERIES
Discharge: HOME | End: 2022-05-25
Payer: COMMERCIAL

## 2022-05-25 PROCEDURE — 97110 THERAPEUTIC EXERCISES: CPT | Mod: GP

## 2022-06-03 ENCOUNTER — HOSPITAL ENCOUNTER (EMERGENCY)
Facility: OTHER | Age: 37
Discharge: HOME | End: 2022-06-03
Attending: PHYSICIAN ASSISTANT | Admitting: PHYSICIAN ASSISTANT
Payer: COMMERCIAL

## 2022-06-03 ENCOUNTER — APPOINTMENT (OUTPATIENT)
Dept: ULTRASOUND IMAGING | Facility: OTHER | Age: 37
End: 2022-06-03
Attending: PHYSICIAN ASSISTANT
Payer: COMMERCIAL

## 2022-06-03 VITALS
SYSTOLIC BLOOD PRESSURE: 140 MMHG | OXYGEN SATURATION: 100 % | TEMPERATURE: 98.8 F | HEART RATE: 88 BPM | RESPIRATION RATE: 16 BRPM | BODY MASS INDEX: 30.96 KG/M2 | WEIGHT: 209 LBS | HEIGHT: 69 IN | DIASTOLIC BLOOD PRESSURE: 76 MMHG

## 2022-06-03 DIAGNOSIS — R79.89 ELEVATED LIVER FUNCTION TESTS: ICD-10-CM

## 2022-06-03 LAB
ALBUMIN SERPL-MCNC: 2.9 G/DL (ref 3.5–5.7)
ALP SERPL-CCNC: 176 U/L (ref 34–104)
ALT SERPL W P-5'-P-CCNC: 70 U/L (ref 7–52)
ANION GAP SERPL CALCULATED.3IONS-SCNC: 6 MMOL/L (ref 3–14)
AST SERPL W P-5'-P-CCNC: 59 U/L (ref 13–39)
BASOPHILS # BLD AUTO: 0.1 10E3/UL (ref 0–0.2)
BASOPHILS NFR BLD AUTO: 0 %
BILIRUB DIRECT SERPL-MCNC: <0.1 MG/DL (ref 0–0.2)
BILIRUB SERPL-MCNC: 0.2 MG/DL (ref 0.3–1)
BUN SERPL-MCNC: 14 MG/DL (ref 7–25)
CALCIUM SERPL-MCNC: 8.8 MG/DL (ref 8.6–10.3)
CHLORIDE BLD-SCNC: 103 MMOL/L (ref 98–107)
CO2 SERPL-SCNC: 27 MMOL/L (ref 21–31)
CREAT SERPL-MCNC: 0.87 MG/DL (ref 0.6–1.2)
EOSINOPHIL # BLD AUTO: 0.4 10E3/UL (ref 0–0.7)
EOSINOPHIL NFR BLD AUTO: 3 %
ERYTHROCYTE [DISTWIDTH] IN BLOOD BY AUTOMATED COUNT: 15.1 % (ref 10–15)
GFR SERPL CREATININE-BSD FRML MDRD: 89 ML/MIN/1.73M2
GLUCOSE BLD-MCNC: 94 MG/DL (ref 70–105)
HCT VFR BLD AUTO: 25.7 % (ref 35–47)
HGB BLD-MCNC: 8.5 G/DL (ref 11.7–15.7)
IMM GRANULOCYTES # BLD: 0.8 10E3/UL
IMM GRANULOCYTES NFR BLD: 6 %
LYMPHOCYTES # BLD AUTO: 2.3 10E3/UL (ref 0.8–5.3)
LYMPHOCYTES NFR BLD AUTO: 17 %
MCH RBC QN AUTO: 30.5 PG (ref 26.5–33)
MCHC RBC AUTO-ENTMCNC: 33.1 G/DL (ref 31.5–36.5)
MCV RBC AUTO: 92 FL (ref 78–100)
MONOCYTES # BLD AUTO: 1.1 10E3/UL (ref 0–1.3)
MONOCYTES NFR BLD AUTO: 8 %
NEUTROPHILS # BLD AUTO: 9.3 10E3/UL (ref 1.6–8.3)
NEUTROPHILS NFR BLD AUTO: 66 %
NRBC # BLD AUTO: 0.1 10E3/UL
NRBC BLD AUTO-RTO: 1 /100
PLATELET # BLD AUTO: 378 10E3/UL (ref 150–450)
POTASSIUM BLD-SCNC: 4.1 MMOL/L (ref 3.5–5.1)
PROT SERPL-MCNC: 5.8 G/DL (ref 6.4–8.9)
RBC # BLD AUTO: 2.79 10E6/UL (ref 3.8–5.2)
SODIUM SERPL-SCNC: 136 MMOL/L (ref 134–144)
WBC # BLD AUTO: 14 10E3/UL (ref 4–11)

## 2022-06-03 PROCEDURE — 76856 US EXAM PELVIC COMPLETE: CPT | Mod: TC

## 2022-06-03 PROCEDURE — 99283 EMERGENCY DEPT VISIT LOW MDM: CPT | Performed by: PHYSICIAN ASSISTANT

## 2022-06-03 PROCEDURE — 85025 COMPLETE CBC W/AUTO DIFF WBC: CPT | Performed by: PHYSICIAN ASSISTANT

## 2022-06-03 PROCEDURE — 99284 EMERGENCY DEPT VISIT MOD MDM: CPT | Mod: 25 | Performed by: PHYSICIAN ASSISTANT

## 2022-06-03 PROCEDURE — 36415 COLL VENOUS BLD VENIPUNCTURE: CPT | Performed by: PHYSICIAN ASSISTANT

## 2022-06-03 PROCEDURE — 80053 COMPREHEN METABOLIC PANEL: CPT | Performed by: PHYSICIAN ASSISTANT

## 2022-06-03 PROCEDURE — 82248 BILIRUBIN DIRECT: CPT | Performed by: PHYSICIAN ASSISTANT

## 2022-06-03 RX ORDER — OXYCODONE HYDROCHLORIDE 5 MG/1
5 TABLET ORAL
COMMUNITY
Start: 2022-06-02

## 2022-06-03 RX ORDER — IBUPROFEN 600 MG/1
600 TABLET, FILM COATED ORAL EVERY 6 HOURS PRN
COMMUNITY

## 2022-06-03 RX ORDER — METHYLERGONOVINE MALEATE 0.2 MG/1
0.2 TABLET ORAL 3 TIMES DAILY
Qty: 15 TABLET | Refills: 0 | Status: SHIPPED | OUTPATIENT
Start: 2022-06-03 | End: 2022-06-08

## 2022-06-03 RX ORDER — METHYLERGONOVINE MALEATE 0.2 MG/1
200 TABLET ORAL ONCE
Status: DISCONTINUED | OUTPATIENT
Start: 2022-06-03 | End: 2022-06-04 | Stop reason: HOSPADM

## 2022-06-03 RX ORDER — ACETAMINOPHEN 500 MG
1000 TABLET ORAL EVERY 6 HOURS PRN
COMMUNITY

## 2022-06-03 ASSESSMENT — ENCOUNTER SYMPTOMS
ABDOMINAL PAIN: 0
FEVER: 0
CONFUSION: 0
DYSURIA: 0
NAUSEA: 0
SHORTNESS OF BREATH: 0
VOMITING: 0

## 2022-06-04 NOTE — DISCHARGE INSTRUCTIONS
Get plenty of fluids and rest.  I did discuss your case with our on-call OB/GYN specialist.  She recommends that you start Methergine medication to help with trell the uterus.  You may develop a little bit of increased bleeding in the coming days as well likely you have a little bit of clotted blood left over that may get pushed out with this medication.  However, if you are having excessive bleeding or very large clots especially with symptoms of blood loss such as shortness of breath and lightheadedness and please return for further evaluation. She did have some elevated liver functions but the recommendation by OB was to have those rechecked early next week with PCP.  Be sure to return the ED if there are worsening or concerning symptoms.

## 2022-06-04 NOTE — ED TRIAGE NOTES
Patient had a  on . She passed a baseball sized clot a half hour ago, she states she has been needing to change a large pad twice daily but the pad has not been saturated. She denies having any vaginal trauma or postoperative complications after her . She has been having less pain today and has not needed to take any pain medications today. Denies any fevers, chills, or dizziness.

## 2022-06-04 NOTE — ED PROVIDER NOTES
History     Chief Complaint   Patient presents with     Postpartum Complications     HPI  Jay Jay Jacobsen is a 36 year old female who presents to the ED for postpartum complications.Patient had a  on . She passed a baseball sized clot a half hour ago, she states she has been needing to change a large pad twice daily but the pad has not been saturated. She denies having any vaginal trauma or postoperative complications after her . She has been having less pain today and has not needed to take any pain medications today. Denies any fevers, chills, or dizziness.     Allergies:  No Known Allergies    Problem List:    There are no problems to display for this patient.       Past Medical History:    Past Medical History:   Diagnosis Date     Attention deficit disorder with hyperactivity        Past Surgical History:    Past Surgical History:   Procedure Laterality Date     MAMMOPLASTY AUGMENTATION      Augmentation mammaplasy w/ prosthesis       Family History:    Family History   Problem Relation Age of Onset     Other - See Comments Brother         Psychiatric illness       Social History:  Marital Status:   [2]        Medications:    acetaminophen (TYLENOL) 500 MG tablet  ibuprofen (ADVIL/MOTRIN) 600 MG tablet  methylergonovine (METHERGINE) 0.2 MG tablet  oxyCODONE (ROXICODONE) 5 MG tablet  amphetamine-dextroamphetamine (ADDERALL XR) 10 MG per 24 hr capsule  etonogestrel-ethinyl estradiol (NUVARING) 0.12-0.015 MG/24HR vaginal ring          Review of Systems   Constitutional: Negative for fever.   HENT: Negative for congestion.    Eyes: Negative for visual disturbance.   Respiratory: Negative for shortness of breath.    Cardiovascular: Negative for chest pain.   Gastrointestinal: Negative for abdominal pain, nausea and vomiting.   Genitourinary: Positive for vaginal bleeding. Negative for dysuria.   Psychiatric/Behavioral: Negative for confusion.       Physical Exam   BP: (!)  "141/81  Pulse: 94  Temp: 98.8  F (37.1  C)  Resp: 16  Height: 174 cm (5' 8.5\")  Weight: 94.8 kg (209 lb)  SpO2: 100 %      Physical Exam  Constitutional:       General: She is not in acute distress.     Appearance: She is well-developed. She is not diaphoretic.   HENT:      Head: Normocephalic and atraumatic.   Eyes:      General: No scleral icterus.     Conjunctiva/sclera: Conjunctivae normal.   Cardiovascular:      Rate and Rhythm: Normal rate and regular rhythm.   Pulmonary:      Effort: Pulmonary effort is normal.      Breath sounds: Normal breath sounds.   Abdominal:      Palpations: Abdomen is soft.      Tenderness: There is no abdominal tenderness.   Musculoskeletal:         General: No deformity.      Cervical back: Neck supple.   Lymphadenopathy:      Cervical: No cervical adenopathy.   Skin:     General: Skin is warm and dry.      Coloration: Skin is not jaundiced.      Findings: No rash.   Neurological:      Mental Status: She is alert and oriented to person, place, and time. Mental status is at baseline.   Psychiatric:         Mood and Affect: Mood normal.         Behavior: Behavior normal.         ED Course                 Procedures              Critical Care time:  none               Results for orders placed or performed during the hospital encounter of 06/03/22 (from the past 24 hour(s))   CBC with platelets differential    Narrative    The following orders were created for panel order CBC with platelets differential.  Procedure                               Abnormality         Status                     ---------                               -----------         ------                     CBC with platelets and d...[[id number]]  Abnormal            Final result                 Please view results for these tests on the individual orders.   Basic metabolic panel   Result Value Ref Range    Sodium 136 134 - 144 mmol/L    Potassium 4.1 3.5 - 5.1 mmol/L    Chloride 103 98 - 107 mmol/L    Carbon Dioxide " (CO2) 27 21 - 31 mmol/L    Anion Gap 6 3 - 14 mmol/L    Urea Nitrogen 14 7 - 25 mg/dL    Creatinine 0.87 0.60 - 1.20 mg/dL    Calcium 8.8 8.6 - 10.3 mg/dL    Glucose 94 70 - 105 mg/dL    GFR Estimate 89 >60 mL/min/1.73m2   CBC with platelets and differential   Result Value Ref Range    WBC Count 14.0 (H) 4.0 - 11.0 10e3/uL    RBC Count 2.79 (L) 3.80 - 5.20 10e6/uL    Hemoglobin 8.5 (L) 11.7 - 15.7 g/dL    Hematocrit 25.7 (L) 35.0 - 47.0 %    MCV 92 78 - 100 fL    MCH 30.5 26.5 - 33.0 pg    MCHC 33.1 31.5 - 36.5 g/dL    RDW 15.1 (H) 10.0 - 15.0 %    Platelet Count 378 150 - 450 10e3/uL    % Neutrophils 66 %    % Lymphocytes 17 %    % Monocytes 8 %    % Eosinophils 3 %    % Basophils 0 %    % Immature Granulocytes 6 %    NRBCs per 100 WBC 1 (H) <1 /100    Absolute Neutrophils 9.3 (H) 1.6 - 8.3 10e3/uL    Absolute Lymphocytes 2.3 0.8 - 5.3 10e3/uL    Absolute Monocytes 1.1 0.0 - 1.3 10e3/uL    Absolute Eosinophils 0.4 0.0 - 0.7 10e3/uL    Absolute Basophils 0.1 0.0 - 0.2 10e3/uL    Absolute Immature Granulocytes 0.8 (H) <=0.4 10e3/uL    Absolute NRBCs 0.1 10e3/uL   Hepatic panel   Result Value Ref Range    Bilirubin Total 0.2 (L) 0.3 - 1.0 mg/dL    Bilirubin Direct <0.1 0.0 - 0.2 mg/dL    Protein Total 5.8 (L) 6.4 - 8.9 g/dL    Albumin 2.9 (L) 3.5 - 5.7 g/dL    Alkaline Phosphatase 176 (H) 34 - 104 U/L    AST 59 (H) 13 - 39 U/L    ALT 70 (H) 7 - 52 U/L   US Pelvis Complete without Transvaginal    Narrative    PROCEDURE INFORMATION:   Exam: US Nonobstetric Pelvis; Complete   Exam date and time: 6/3/2022 8:41 PM   Age: 36 years old   Clinical indication: Other: Passed clot; Prior surgery; Surgery date: 3-7 days   post-operative; Additional info: 4 days S/P c section. Increased vaginal   bleeding/clots     TECHNIQUE:   Imaging protocol: Transabdominal pelvic nonobstetric ultrasound. Complete exam.   Real time ultrasound with image documentation.     COMPARISON:   No relevant prior studies available.     FINDINGS:    Uterus: Uterus measures 20.0 x 13.6 x 9.9 cm. Endometrium is heterogeneous and   measures 2.6 cm double layer thickness.   Right ovary/adnexa: Right ovary measures 5.7 x 3.4 x 3.0 cm. Normal appearance   and blood flow.   Left ovary/adnexa: Left ovary measures 5.8 x 4.1 x 2.3 cm. Normal appearance   and blood flow.   Intraperitoneal space: No pelvic free fluid.   Urinary bladder: Unremarkable.       Impression    IMPRESSION:   1. Thickened heterogeneous endometrial contents, concerning for retained   products of conception.   2. Post gravid enlargement of the uterus.     THIS DOCUMENT HAS BEEN ELECTRONICALLY SIGNED BY CODY GONZALEZ MD       Medications   methylergonovine (METHERGINE) tablet 200 mcg (200 mcg Oral Not Given 6/3/22 2225)       Assessments & Plan (with Medical Decision Making)   Pt nontoxic in NAD. Heart, lung. VSS, afebrile.     Hemoglobin 8.5, after discharging from the hospital yesterday was 8.8 so this seems stable.  She does have some elevated liver functions which may have been occurring throughout recent hospital stay.  Platelets are normal, normal kidney functions.    Pelvic ultrasound read as:  1. Thickened heterogeneous endometrial contents, concerning for retained   products of conception.   2. Post gravid enlargement of the uterus.    I discussed the case with Dr. Flower, OB/GYN on-call.  She thinks it is very unlikely that there are POC following  the most likely findings are consistent with clots.  She recommends starting the patient on Methergine with close OB/GYN and PCP follow-up.  We also discussed her preeclampsia in vitals and lab findings today.  She did not recommend any further treatments with regards to preeclampsia at this time.    The patient did decline Methergine this evening however I did prescribe it to her.  She says she would like to read about it more before taking it.    She has not passed any further clots and vital signs remained very stable.  She feels  comfortable going home at this time.  Referrals were placed to follow-up with PCP as well as to have repeat liver studies early next week.  She is told to return to ED if there are any worsening or concerning symptoms including passages of larger blood clots again or signs of blood loss such as increased shortness of breath, lightheadedness, etc.  She understands and agrees with plan the patient is discharged.    Sonny Clemons PA-C    I have reviewed the nursing notes.    I have reviewed the findings, diagnosis, plan and need for follow up with the patient.       Discharge Medication List as of 6/3/2022 10:17 PM      START taking these medications    Details   methylergonovine (METHERGINE) 0.2 MG tablet Take 1 tablet (200 mcg) by mouth 3 times daily for 5 days, Disp-15 tablet, R-0, E-Prescribe             Final diagnoses:   Postpartum bleeding   Elevated liver function tests       6/3/2022   [facility] [place]     Sonny Clemons PA  06/03/22 0205

## 2022-06-21 NOTE — PROGRESS NOTES
[facility]    Outpatient Physical Therapy Discharge Note  Patient: Jay Jay Jacobsen  : 1985    Beginning/End Dates of Reporting Period:  22 to 22    Referring Provider: Dr. Sullivan    Therapy Diagnosis: neck and shoulder tissue tension with radicular symptoms.      Client Self Report: Pt reports she is walking a lot and working on dialating as she is due on friday.    Objective Measurements:  Objective Measure: pain  Details: no pain in feet, numbness in B calcanous  Objective Measure: ROM  Details: improved ROM in hips as pregnancy improves.    Goals:  Goal Identifier Pain   Goal Description pt will report a 2/10 pain in neck and upper shoulders during ADL's to increase tolerance to daily tasks.    Target Date 22   Date Met      Progress (detail required for progress note): Pt reports pain is about the same but has not gotten worse with progression of swelling and pregnancy indicating progression     Goal Identifier ROM   Goal Description Pt will demonstrate 20 deg of B neck side bending to increase neck ROM for dressing and driving.    Target Date 22   Date Met  22   Progress (detail required for progress note): MET, pt presents with normalized equal neck ROM     Goal Identifier Radicular symptoms   Goal Description Pt will report numbness/tingling intermittently to B elbows by the end of the day to increase dexterity in hands and improve sleeping tolerance   Target Date 22   Date Met      Progress (detail required for progress note): Progressing, swelling under control but radicular symptoms about the same, has not gotten worse with progression of pregnancy.      Goal Identifier sleeping   Goal Description pt will report no sleep disturbances due to upper body pain or numbness for 1 week to progress towards prior sleep hygiene.    Target Date 22   Date Met       Progress (detail required for progress note): Progressing, pt still waking up due to shoulder/neck pain but is getting less with modifications to types of pillows and setup.      Plan:  Discharge from therapy.    Discharge:    Reason for Discharge: No further expectation of progress.    Equipment Issued: HEP    Discharge Plan: Patient to continue home program.

## 2022-09-17 ENCOUNTER — HEALTH MAINTENANCE LETTER (OUTPATIENT)
Age: 37
End: 2022-09-17

## 2023-06-04 ENCOUNTER — HEALTH MAINTENANCE LETTER (OUTPATIENT)
Age: 38
End: 2023-06-04

## 2023-08-02 ENCOUNTER — HOSPITAL ENCOUNTER (OUTPATIENT)
Dept: MRI IMAGING | Facility: OTHER | Age: 38
Discharge: HOME | End: 2023-08-02
Attending: STUDENT IN AN ORGANIZED HEALTH CARE EDUCATION/TRAINING PROGRAM | Admitting: STUDENT IN AN ORGANIZED HEALTH CARE EDUCATION/TRAINING PROGRAM
Payer: COMMERCIAL

## 2023-08-02 DIAGNOSIS — M79.89 MASS OF SOFT TISSUE: ICD-10-CM

## 2023-08-02 DIAGNOSIS — M79.672 LEFT FOOT PAIN: ICD-10-CM

## 2023-08-02 PROCEDURE — 73720 MRI LWR EXTREMITY W/O&W/DYE: CPT | Mod: LT

## 2023-08-02 PROCEDURE — A9575 INJ GADOTERATE MEGLUMI 0.1ML: HCPCS

## 2023-08-02 PROCEDURE — 255N000002 HC RX 255 OP 636

## 2023-08-02 RX ORDER — GADOTERATE MEGLUMINE 376.9 MG/ML
20 INJECTION INTRAVENOUS ONCE
Status: COMPLETED | OUTPATIENT
Start: 2023-08-02 | End: 2023-08-02

## 2023-08-02 RX ADMIN — GADOTERATE MEGLUMINE 19 ML: 376.9 INJECTION INTRAVENOUS at 17:35

## 2024-02-04 ENCOUNTER — HOSPITAL ENCOUNTER (EMERGENCY)
Facility: OTHER | Age: 39
Discharge: HOME | End: 2024-02-04
Attending: FAMILY MEDICINE | Admitting: FAMILY MEDICINE
Payer: COMMERCIAL

## 2024-02-04 ENCOUNTER — APPOINTMENT (OUTPATIENT)
Dept: GENERAL RADIOLOGY | Facility: OTHER | Age: 39
End: 2024-02-04
Attending: FAMILY MEDICINE
Payer: COMMERCIAL

## 2024-02-04 VITALS
OXYGEN SATURATION: 98 % | DIASTOLIC BLOOD PRESSURE: 80 MMHG | TEMPERATURE: 97.7 F | HEART RATE: 88 BPM | WEIGHT: 209 LBS | HEIGHT: 68 IN | BODY MASS INDEX: 31.67 KG/M2 | SYSTOLIC BLOOD PRESSURE: 130 MMHG | RESPIRATION RATE: 16 BRPM

## 2024-02-04 DIAGNOSIS — R06.02 SHORTNESS OF BREATH: ICD-10-CM

## 2024-02-04 DIAGNOSIS — R07.89 CHEST TIGHTNESS: ICD-10-CM

## 2024-02-04 LAB
ANION GAP SERPL CALCULATED.3IONS-SCNC: 11 MMOL/L (ref 7–15)
BASOPHILS # BLD AUTO: 0.1 10E3/UL (ref 0–0.2)
BASOPHILS NFR BLD AUTO: 1 %
BUN SERPL-MCNC: 14.3 MG/DL (ref 6–20)
CALCIUM SERPL-MCNC: 9.1 MG/DL (ref 8.6–10)
CHLORIDE SERPL-SCNC: 103 MMOL/L (ref 98–107)
CREAT SERPL-MCNC: 1.15 MG/DL (ref 0.51–0.95)
CRP SERPL-MCNC: 5.84 MG/L
D DIMER PPP FEU-MCNC: 0.47 UG/ML FEU (ref 0–0.5)
DEPRECATED HCO3 PLAS-SCNC: 22 MMOL/L (ref 22–29)
EGFRCR SERPLBLD CKD-EPI 2021: 63 ML/MIN/1.73M2
EOSINOPHIL # BLD AUTO: 0.5 10E3/UL (ref 0–0.7)
EOSINOPHIL NFR BLD AUTO: 5 %
ERYTHROCYTE [DISTWIDTH] IN BLOOD BY AUTOMATED COUNT: 12.8 % (ref 10–15)
FLUAV RNA SPEC QL NAA+PROBE: NEGATIVE
FLUBV RNA RESP QL NAA+PROBE: NEGATIVE
GLUCOSE SERPL-MCNC: 121 MG/DL (ref 70–99)
HCG UR QL: NEGATIVE
HCT VFR BLD AUTO: 39.3 % (ref 35–47)
HGB BLD-MCNC: 13.6 G/DL (ref 11.7–15.7)
HOLD SPECIMEN: NORMAL
IMM GRANULOCYTES # BLD: 0 10E3/UL
IMM GRANULOCYTES NFR BLD: 0 %
LYMPHOCYTES # BLD AUTO: 2.2 10E3/UL (ref 0.8–5.3)
LYMPHOCYTES NFR BLD AUTO: 21 %
MCH RBC QN AUTO: 30.6 PG (ref 26.5–33)
MCHC RBC AUTO-ENTMCNC: 34.6 G/DL (ref 31.5–36.5)
MCV RBC AUTO: 89 FL (ref 78–100)
MONOCYTES # BLD AUTO: 0.5 10E3/UL (ref 0–1.3)
MONOCYTES NFR BLD AUTO: 5 %
NEUTROPHILS # BLD AUTO: 7.1 10E3/UL (ref 1.6–8.3)
NEUTROPHILS NFR BLD AUTO: 68 %
NRBC # BLD AUTO: 0 10E3/UL
NRBC BLD AUTO-RTO: 0 /100
NT-PROBNP SERPL-MCNC: 49 PG/ML (ref 0–450)
PLATELET # BLD AUTO: 279 10E3/UL (ref 150–450)
POTASSIUM SERPL-SCNC: 3.6 MMOL/L (ref 3.4–5.3)
PROCALCITONIN SERPL IA-MCNC: 0.04 NG/ML
RBC # BLD AUTO: 4.44 10E6/UL (ref 3.8–5.2)
RSV RNA SPEC NAA+PROBE: NEGATIVE
SARS-COV-2 RNA RESP QL NAA+PROBE: NEGATIVE
SODIUM SERPL-SCNC: 136 MMOL/L (ref 135–145)
TROPONIN T SERPL HS-MCNC: <6 NG/L
WBC # BLD AUTO: 10.5 10E3/UL (ref 4–11)

## 2024-02-04 PROCEDURE — 85004 AUTOMATED DIFF WBC COUNT: CPT | Performed by: FAMILY MEDICINE

## 2024-02-04 PROCEDURE — 93010 ELECTROCARDIOGRAM REPORT: CPT | Performed by: INTERNAL MEDICINE

## 2024-02-04 PROCEDURE — 81025 URINE PREGNANCY TEST: CPT | Performed by: FAMILY MEDICINE

## 2024-02-04 PROCEDURE — 93005 ELECTROCARDIOGRAM TRACING: CPT | Performed by: FAMILY MEDICINE

## 2024-02-04 PROCEDURE — 87637 SARSCOV2&INF A&B&RSV AMP PRB: CPT | Performed by: FAMILY MEDICINE

## 2024-02-04 PROCEDURE — 83880 ASSAY OF NATRIURETIC PEPTIDE: CPT | Performed by: STUDENT IN AN ORGANIZED HEALTH CARE EDUCATION/TRAINING PROGRAM

## 2024-02-04 PROCEDURE — 99284 EMERGENCY DEPT VISIT MOD MDM: CPT | Performed by: FAMILY MEDICINE

## 2024-02-04 PROCEDURE — 36415 COLL VENOUS BLD VENIPUNCTURE: CPT | Performed by: FAMILY MEDICINE

## 2024-02-04 PROCEDURE — 94640 AIRWAY INHALATION TREATMENT: CPT

## 2024-02-04 PROCEDURE — 71045 X-RAY EXAM CHEST 1 VIEW: CPT

## 2024-02-04 PROCEDURE — 84484 ASSAY OF TROPONIN QUANT: CPT | Performed by: STUDENT IN AN ORGANIZED HEALTH CARE EDUCATION/TRAINING PROGRAM

## 2024-02-04 PROCEDURE — 99285 EMERGENCY DEPT VISIT HI MDM: CPT | Mod: 25 | Performed by: FAMILY MEDICINE

## 2024-02-04 PROCEDURE — 84145 PROCALCITONIN (PCT): CPT | Performed by: FAMILY MEDICINE

## 2024-02-04 PROCEDURE — 86140 C-REACTIVE PROTEIN: CPT | Performed by: FAMILY MEDICINE

## 2024-02-04 PROCEDURE — 80048 BASIC METABOLIC PNL TOTAL CA: CPT | Performed by: FAMILY MEDICINE

## 2024-02-04 PROCEDURE — 85379 FIBRIN DEGRADATION QUANT: CPT | Performed by: STUDENT IN AN ORGANIZED HEALTH CARE EDUCATION/TRAINING PROGRAM

## 2024-02-04 PROCEDURE — 250N000009 HC RX 250: Performed by: STUDENT IN AN ORGANIZED HEALTH CARE EDUCATION/TRAINING PROGRAM

## 2024-02-04 RX ORDER — IPRATROPIUM BROMIDE AND ALBUTEROL SULFATE 2.5; .5 MG/3ML; MG/3ML
3 SOLUTION RESPIRATORY (INHALATION) ONCE
Status: COMPLETED | OUTPATIENT
Start: 2024-02-04 | End: 2024-02-04

## 2024-02-04 RX ORDER — PREDNISONE 20 MG/1
40 TABLET ORAL DAILY
Qty: 10 TABLET | Refills: 0 | Status: SHIPPED | OUTPATIENT
Start: 2024-02-04 | End: 2024-02-09

## 2024-02-04 RX ORDER — ALBUTEROL SULFATE 90 UG/1
2 AEROSOL, METERED RESPIRATORY (INHALATION) EVERY 6 HOURS PRN
Qty: 8 G | Refills: 0 | Status: SHIPPED | OUTPATIENT
Start: 2024-02-04

## 2024-02-04 RX ADMIN — IPRATROPIUM BROMIDE AND ALBUTEROL SULFATE 3 ML: .5; 3 SOLUTION RESPIRATORY (INHALATION) at 20:33

## 2024-02-04 ASSESSMENT — ENCOUNTER SYMPTOMS
CHEST TIGHTNESS: 1
FEVER: 0
SHORTNESS OF BREATH: 1

## 2024-02-04 ASSESSMENT — ACTIVITIES OF DAILY LIVING (ADL)
ADLS_ACUITY_SCORE: 35
ADLS_ACUITY_SCORE: 35

## 2024-02-05 LAB
ATRIAL RATE - MUSE: 83 BPM
DIASTOLIC BLOOD PRESSURE - MUSE: NORMAL MMHG
INTERPRETATION ECG - MUSE: NORMAL
P AXIS - MUSE: 61 DEGREES
PR INTERVAL - MUSE: 172 MS
QRS DURATION - MUSE: 100 MS
QT - MUSE: 378 MS
QTC - MUSE: 444 MS
R AXIS - MUSE: 16 DEGREES
SYSTOLIC BLOOD PRESSURE - MUSE: NORMAL MMHG
T AXIS - MUSE: 40 DEGREES
VENTRICULAR RATE- MUSE: 83 BPM

## 2024-02-05 NOTE — ED TRIAGE NOTES
Pt here by herself, pt reports feeling SOB and/or inability to catch her breath since last night, VSS, pt denies any pain, pt reports a runny nose and recent negative covid test, VSS, no acute distress, pt brought back into ER to be evaluated   Triage Assessment (Adult)       Row Name 02/04/24 1826          Triage Assessment    Airway WDL WDL        Respiratory WDL    Respiratory WDL WDL        Skin Circulation/Temperature WDL    Skin Circulation/Temperature WDL WDL        Cardiac WDL    Cardiac WDL WDL        Peripheral/Neurovascular WDL    Peripheral Neurovascular WDL WDL        Cognitive/Neuro/Behavioral WDL    Cognitive/Neuro/Behavioral WDL WDL

## 2024-02-05 NOTE — ED PROVIDER NOTES
ED PROVIDER NOTE  Patient Name: Jay Jay Jacobsen  MRN: [id number]    CC:    Chief Complaint   Patient presents with    Shortness of Breath     Patient was signed out to me by my partner Dr. Shah.    Patient is a 38-year-old female who presents with shortness of breath starting last night 11 PM.  Chest pain tightness heaviness feels like she cannot take a full breath.    ED Events, Labs and Imaging:  ED Course as of 02/04/24 2113   Sun Feb 04, 2024 1951 D-Dimer Quantitative: 0.47   1959 WBC: 10.5   2000 Hemoglobin: 13.6   2000 CRP Inflammation(!): 5.84   2000 D-Dimer Quantitative: 0.47   2000 HCG Qual Urine: Negative   2000 Procalcitonin: 0.04   2000 Sodium: 136   2000 Chloride: 103   2000 Influenza A: Negative   2000 Influenza B: Negative   2000 Resp Syncytial Virus: Negative   2000 SARS CoV2 PCR: Negative   2023 Troponin T, High Sensitivity: <6       1825 151/83 98.3  F (36.8  C) Tympanic 103 16 96 % 94.8 kg (209     During my interview with patient, she describes pleuritic chest pain, shortness of breath.  She last had viral symptoms in December but this completely resolved.  The symptoms seem worse with exertion.  She is wondering if cold has any do with it.  In addition she is breast-feeding currently, and is wondering whether this has caused affection of some kind such as mastitis traveling to her lungs.  She denies any smoking history.  She has no fevers or chills.  No recent prolonged travel, no estrogen intake, no history of DVTs cancer in the family.    On exam, she has bilateral expiratory wheezes which are mild.    X-ray of her chest reveals no acute infiltrate, however the lung fields appear possibly little bit larger than I expected.    Diagnosis includes shortness of breath    Morning of the symptoms are secondary to bronchitis or adult-onset asthma.  Trial DuoNeb with significant improvement.  In addition regarding her chest pain I added on troponins which revealed negative.  Concern for  coronary etiologies are low.  I am wondering if she has adult onset asthma, she denies any recent allergy exposures.  She does have a history of allergies however.  Pulmonary referral was made.  Albuterol inhaler given given improvement.  Prednisone for 5 days as a trial.  Strict return precautions.    Marvin Kessler MD  Emergency Medicine    Dictation Disclaimer: Some notes are completed with voice-recognition dictation software. Errors are generally corrected in real time. Please contact me via Epic staff message if you note any errors requiring clarification.     Nicolas Kessler MD  02/04/24 1406       Nicolas Kessler MD  02/05/24 9229

## 2024-02-05 NOTE — DISCHARGE INSTRUCTIONS
Follow-up with your primary care doctor the next 2 to 3 days.    I placed referral for you to be seen by pulmonology specialist.[facility] will call you to coordinate your care as prescribed by the provider. If you don t hear from a representative within 2 business days, please call [phone].     Take prednisone for the next 5 days.    Albuterol Inhaler (Short-acting beta agonist):    Prepare the Inhaler:  Shake the inhaler well before each use.  Remove the cap from the mouthpiece.    Prime the Inhaler (if needed):  If it's the first time using the inhaler or if it hasn't been used for a while, prime it by spraying into the air away from your face.    Breathe Out:  Hold the inhaler upright with the mouthpiece at the bottom.  Breathe out fully to empty your lungs.    Inhale the Medication:  Place the mouthpiece between your teeth and close your lips around it, ensuring a tight seal.  Start inhaling slowly and deeply through your mouth.    Actuate the Inhaler:  Press down on the canister to release one puff of medication as you continue to inhale deeply.  Hold your breath for about 10 seconds or as long as comfortable.    Breathe Out and Repeat (if necessary):  Breathe out slowly away from the inhaler.  If prescribed more than one puff, wait about 1 minute before repeating steps 3 to 5.    Rinse Your Mouth (if necessary):  After using the inhaler, rinse your mouth with water and spit it out to prevent throat irritation or thrush.    Replace the Cap:  Replace the cap on the mouthpiece after each use to protect the inhaler from dust and debris.    You have any worsening or concerning symptoms please call 911 or return to the emergency department immediately.

## 2024-02-05 NOTE — ED PROVIDER NOTES
"  History     Chief Complaint   Patient presents with    Shortness of Breath     The history is provided by the patient.     Jay Jay Jacobsen is a 38 year old female here with SOB that started last night at about 11 PM. She has chest tightness, chest heaviness and feels like she cannot take a full breath. No fever, no chills, she had some sweats today. She had a negative home COVID test today. No history of DVT or PE. No sick contacts.     Allergies:  Allergies   Allergen Reactions    Penicillins        Problem List:    There are no problems to display for this patient.       Past Medical History:    Past Medical History:   Diagnosis Date    Attention deficit disorder with hyperactivity        Past Surgical History:    Past Surgical History:   Procedure Laterality Date    MAMMOPLASTY AUGMENTATION      Augmentation mammaplasy w/ prosthesis       Family History:    Family History   Problem Relation Age of Onset    Other - See Comments Brother         Psychiatric illness       Social History:  Marital Status:   [2]        Medications:    acetaminophen (TYLENOL) 500 MG tablet  amphetamine-dextroamphetamine (ADDERALL XR) 10 MG per 24 hr capsule  etonogestrel-ethinyl estradiol (NUVARING) 0.12-0.015 MG/24HR vaginal ring  ibuprofen (ADVIL/MOTRIN) 600 MG tablet  oxyCODONE (ROXICODONE) 5 MG tablet      Review of Systems   Constitutional:  Negative for fever.   Respiratory:  Positive for chest tightness and shortness of breath.    All other systems reviewed and are negative.      Physical Exam   BP: (!) 151/83  Pulse: 103  Temp: 98.3  F (36.8  C)  Resp: 16  Height: 172.7 cm (5' 8\")  Weight: 94.8 kg (209 lb)  SpO2: 96 %      Physical Exam  Vitals and nursing note reviewed.   Constitutional:       General: She is not in acute distress.     Appearance: She is well-developed. She is not ill-appearing, toxic-appearing or diaphoretic.   Cardiovascular:      Rate and Rhythm: Regular rhythm. Tachycardia present.      " Pulses: Normal pulses.      Heart sounds: Normal heart sounds.   Pulmonary:      Effort: Pulmonary effort is normal. No respiratory distress.      Breath sounds: Wheezing present. No decreased breath sounds, rhonchi or rales.   Musculoskeletal:      Right lower leg: No tenderness. No edema.      Left lower leg: No tenderness. No edema.   Skin:     General: Skin is warm and dry.   Neurological:      General: No focal deficit present.      Mental Status: She is alert and oriented to person, place, and time.         Results for orders placed or performed during the hospital encounter of 02/04/24 (from the past 24 hour(s))   Symptomatic Influenza A/B, RSV, & SARS-CoV2 PCR (COVID-19) Nose    Specimen: Nose; Swab   Result Value Ref Range    Influenza A PCR Negative Negative    Influenza B PCR Negative Negative    RSV PCR Negative Negative    SARS CoV2 PCR Negative Negative    Narrative    Testing was performed using the Xpert Xpress CoV2/Flu/RSV Assay on the [employer] GeneXpert Instrument. This test should be ordered for the detection of SARS-CoV-2, influenza, and RSV viruses in individuals who meet clinical and/or epidemiological criteria. Test performance is unknown in asymptomatic patients. This test is for in vitro diagnostic use under the FDA EUA for laboratories certified under CLIA to perform high or moderate complexity testing. This test has not been FDA cleared or approved. A negative result does not rule out the presence of PCR inhibitors in the specimen or target RNA in concentration below the limit of detection for the assay. If only one viral target is positive but coinfection with multiple targets is suspected, the sample should be re-tested with another FDA cleared, approved, or authorized test, if coinfection would change clinical management. This test was validated by the [facility]. These laboratories are certified under the Clinical Laboratory Improvement Amendments of 1988 (CLIA-88)  as qualified to perform high complexity laboratory testing.   CBC with platelets differential    Narrative    The following orders were created for panel order CBC with platelets differential.  Procedure                               Abnormality         Status                     ---------                               -----------         ------                     CBC with platelets and d...[[id number]]                      Final result                 Please view results for these tests on the individual orders.   Basic metabolic panel   Result Value Ref Range    Sodium 136 135 - 145 mmol/L    Potassium 3.6 3.4 - 5.3 mmol/L    Chloride 103 98 - 107 mmol/L    Carbon Dioxide (CO2) 22 22 - 29 mmol/L    Anion Gap 11 7 - 15 mmol/L    Urea Nitrogen 14.3 6.0 - 20.0 mg/dL    Creatinine 1.15 (H) 0.51 - 0.95 mg/dL    GFR Estimate 63 >60 mL/min/1.73m2    Calcium 9.1 8.6 - 10.0 mg/dL    Glucose 121 (H) 70 - 99 mg/dL   CRP inflammation   Result Value Ref Range    CRP Inflammation 5.84 (H) <5.00 mg/L   CBC with platelets and differential   Result Value Ref Range    WBC Count 10.5 4.0 - 11.0 10e3/uL    RBC Count 4.44 3.80 - 5.20 10e6/uL    Hemoglobin 13.6 11.7 - 15.7 g/dL    Hematocrit 39.3 35.0 - 47.0 %    MCV 89 78 - 100 fL    MCH 30.6 26.5 - 33.0 pg    MCHC 34.6 31.5 - 36.5 g/dL    RDW 12.8 10.0 - 15.0 %    Platelet Count 279 150 - 450 10e3/uL    % Neutrophils 68 %    % Lymphocytes 21 %    % Monocytes 5 %    % Eosinophils 5 %    % Basophils 1 %    % Immature Granulocytes 0 %    NRBCs per 100 WBC 0 <1 /100    Absolute Neutrophils 7.1 1.6 - 8.3 10e3/uL    Absolute Lymphocytes 2.2 0.8 - 5.3 10e3/uL    Absolute Monocytes 0.5 0.0 - 1.3 10e3/uL    Absolute Eosinophils 0.5 0.0 - 0.7 10e3/uL    Absolute Basophils 0.1 0.0 - 0.2 10e3/uL    Absolute Immature Granulocytes 0.0 <=0.4 10e3/uL    Absolute NRBCs 0.0 10e3/uL   HCG qualitative urine   Result Value Ref Range    hCG Urine Qualitative Negative Negative   Extra Tube    Narrative     "The following orders were created for panel order Extra Tube.  Procedure                               Abnormality         Status                     ---------                               -----------         ------                     Extra Blue Top Tube[[id number]]                              In process                 Extra Red Top Tube[[id number]]                               In process                 Extra Green Top (Lithium...[[id number]]                      In process                   Please view results for these tests on the individual orders.   XR Chest Port 1 View    Narrative    PROCEDURE:  XR CHEST PORT 1 VIEW    HISTORY:  SOB, tightness in her chest.     COMPARISON:  None.    FINDINGS:   The cardiac silhouette is normal in size. The pulmonary vasculature is  normal.  The lungs are clear. No pleural effusion or pneumothorax.      Impression    IMPRESSION:  No acute cardiopulmonary disease.      MORALES FLORES MD         SYSTEM ID:  [id number]       Medications - No data to display    Assessments & Plan (with Medical Decision Making)  Jay Jay Jacobsen is a 38 year old female here with SOB that started last night at about 11 PM. She has chest tightness, chest heaviness and feels like she cannot take a full breath. No fever, no chills, she had some sweats today. She had a negative home COVID test today. No history of DVT or PE. No sick contacts.   VS in the ED BP (!) 151/83   Pulse 103   Temp 98.3  F (36.8  C) (Tympanic)   Resp 16   Ht 1.727 m (5' 8\")   Wt 94.8 kg (209 lb)   SpO2 96%   BMI 31.78 kg/m    Exam shows some expiratory wheeze. She has tachycardia. No LE swelling or tenderness.    Labs show CBC normal, BMP with Cr 1.15, CRP 5.84, UPT negative.  4 Plex and PCT pending.   Chest xray clear.  7 PM  I am signing out her care to Dr Kessler at change of shift.      I have reviewed the nursing notes.    I have reviewed the findings, diagnosis, plan and need for follow up with the " patient.  Medical Decision Making  The patient's presentation was of moderate complexity (an undiagnosed new problem with uncertain diagnosis).    The patient's evaluation involved:  an assessment requiring an independent historian (see separate area of note for details)  ordering and/or review of 3+ test(s) in this encounter (see separate area of note for details)    The patient's management necessitated further care after sign-out to Dr Kessler (see their note for further management).      Final diagnoses:   Shortness of breath   Chest tightness       2/4/2024   [facility] AND [facility], Kendall Mariano MD  02/04/24 1925

## 2024-03-18 ENCOUNTER — THERAPY VISIT (OUTPATIENT)
Dept: CHIROPRACTIC MEDICINE | Facility: OTHER | Age: 39
End: 2024-03-18
Attending: CHIROPRACTOR
Payer: COMMERCIAL

## 2024-03-18 VITALS — TEMPERATURE: 98.1 F | SYSTOLIC BLOOD PRESSURE: 122 MMHG | DIASTOLIC BLOOD PRESSURE: 78 MMHG | OXYGEN SATURATION: 97 %

## 2024-03-18 DIAGNOSIS — M99.02 SEGMENTAL AND SOMATIC DYSFUNCTION OF THORACIC REGION: ICD-10-CM

## 2024-03-18 DIAGNOSIS — M54.6 ACUTE BILATERAL THORACIC BACK PAIN: ICD-10-CM

## 2024-03-18 DIAGNOSIS — M99.07 SOMATIC DYSFUNCTION OF UPPER EXTREMITIES: ICD-10-CM

## 2024-03-18 DIAGNOSIS — M54.2 CERVICALGIA: ICD-10-CM

## 2024-03-18 DIAGNOSIS — M53.3 SI (SACROILIAC) JOINT DYSFUNCTION: ICD-10-CM

## 2024-03-18 DIAGNOSIS — M25.531 RIGHT WRIST PAIN: ICD-10-CM

## 2024-03-18 DIAGNOSIS — M99.04 SEGMENTAL AND SOMATIC DYSFUNCTION OF SACRAL REGION: ICD-10-CM

## 2024-03-18 DIAGNOSIS — M99.01 SEGMENTAL AND SOMATIC DYSFUNCTION OF CERVICAL REGION: Primary | ICD-10-CM

## 2024-03-18 PROCEDURE — 98943 CHIROPRACT MANJ XTRSPINL 1/>: CPT | Performed by: CHIROPRACTOR

## 2024-03-18 PROCEDURE — 98941 CHIROPRACT MANJ 3-4 REGIONS: CPT | Mod: AT | Performed by: CHIROPRACTOR

## 2024-03-18 PROCEDURE — 99212 OFFICE O/P EST SF 10 MIN: CPT | Mod: 25 | Performed by: CHIROPRACTOR

## 2024-03-18 NOTE — PROGRESS NOTES
Bilateral neck is constantly aching with pain rated 2/10 W24 2/10  Bilateral upper back is constantly aching with pain rated 4/10 W24 4/10  Bandar Carranza on 3/18/2024 at 10:12 AM     Reviewed by EW    Visit #:  1  New episode of care  Reassessment    Subjective:  Jay Jay Jacobsen is a 38 year old female who is seen for:        Segmental and somatic dysfunction of cervical region  Segmental and somatic dysfunction of thoracic region  Cervicalgia  Acute bilateral thoracic back pain  SI (sacroiliac) joint dysfunction  Segmental and somatic dysfunction of sacral region  Right wrist pain  Somatic dysfunction of upper extremities.      Jay Jay Jacobsen reports: Presents with concerns of neck and back pain.  No specific cause such as traumatic events.  Patient has been caring for child who is just shy of 2 years old.  No red flags consistent with cauda equina.    Patient is having right wrist pain.  Attributes this to holding her child which began a couple weeks ago.  Pain localized around the radial side near the anatomical snuffbox.  No reported trauma to this area.  Patient does have wrist splints.      (DVPRS) Pain Rating Score : 4-Distracts me, can do usual activities (W24 4/10) (03/18/24 1010)     Objective:  The following was observed:  /78 (BP Location: Right arm)   Temp 98.1  F (36.7  C) (Tympanic)   SpO2 97%        3/18/2024    10:00 AM   Neck Disability Index (  [employer]. 1991. All rights reserved.; used with permission)   SECTION 1 - PAIN INTENSITY 1   SECTION 2 - PERSONAL CARE 0   SECTION 3 - LIFTING 0   SECTION 4 - READING 0   SECTION 5 - HEADACHES 0   SECTION 6 - CONCENTRATION 0   SECTION 7 - WORK 0   SECTION 8 - DRIVING 1   SECTION 9 - SLEEPING 0   SECTION 10 - RECREATION 1   Count 10   Sum 3   Raw Score: /50 3   Neck Disability Index Score: (%) 6 %      Cervical AROM: Generally within normal limits, minimal restriction with lateral flexion bilaterally    Cervical  distraction: -  Cervical compression: -    Oswestry (JEANETTE) Questionnaire        3/18/2024    10:00 AM   OSWESTRY DISABILITY INDEX   Count 9   Sum 2   Oswestry Score (%) 4.44 %      Thoracic/lumbar AROM: Unremarkable    Slumps:-right, -left  Ely's: -right, -left      P: palpatory tenderness C2 left, C7 right, PSIS bilaterally, mild anatomical snuffbox.  No palpatory tenderness present, de Quervain's:    A: static palpation demonstrates intersegmental asymmetry , cervical, thoracic, pelvis, right wrist  R: motion palpation notes restricted motion, C2 , C7 , T4 , T9 , Sacrum , Extra-spinal:, and right capitate, right scaphoid  T: muscle spasm at level(s):  Upper trapezius and rhomboids bilaterally, hypertonicity noted quadratus lumborum bilaterally and cervical paraspinals bilaterally:      Segmental spinal dysfunction/restrictions found at:  :  C2 Left lateral flexion restricted and Extension restriction  C7 Right lateral flexion restricted and Extension restriction  T4 Right lateral flexion restricted and Extension restriction  T9 Left lateral flexion restricted and Extension restriction  T12 Extension restriction  Sacrum Extension restriction.    Mild restriction noted with right scaphoid, right capitate, flexion and radial deviation      Assessment: Segmental/somatic dysfunction apparent in the cervical, thoracic, pelvic regions.  Patient has been under our care in the past for similar concerns and responded favorably with chiropractic intervention.  Suspect overuse or caring for young child as contributing factors.  Plan to follow-up with patient if needed.    Right wrist: Chiropractic assessment does suggest possibility of somatic dysfunction of the carpals as a contributing factor.  No contraindications precluding patient from receiving chiropractic adjustment today.  Instructed patient to utilize splints especially when holding child for additional support.  If improvement is minimal or if symptoms fail to  improve after today consider taking an x-ray for further assessment.    Diagnoses:      1. Segmental and somatic dysfunction of cervical region    2. Segmental and somatic dysfunction of thoracic region    3. Cervicalgia    4. Acute bilateral thoracic back pain    5. SI (sacroiliac) joint dysfunction    6. Segmental and somatic dysfunction of sacral region    7. Right wrist pain    8. Somatic dysfunction of upper extremities        Patient's condition:  Patient had restrictions pre-manipulation    Treatment effectiveness:  Post manipulation there is better intersegmental movement      Procedures:  E/M 05717    [facility]:  32205 Chiropractic manipulative treatment 3-4 regions performed   00523 Chiropractic manipulative treatment extraspinal dysfunction/restriction  Cervical: Diversified, C2, C7 , Supine  Thoracic: Diversified, T4, T9, T12, Prone  Pelvis: Drop Table, Sacrum , Prone  Extra-spinal: gentle mobilization/diversified, right capitate, right scaphoid, Seated    Modalities:  None performed this visit    Therapeutic procedures:  None    Response to Treatment  Improved intersegmental motion    Prognosis: Good    Progress towards Goals: Improve back pain by 50% within 4 weeks  Improve right wrist pain by up to 50% within 4 weeks  Patient will be able to hold her child without painful limits within 4 weeks     Recommendations:    Instructions: Monitor symptoms closely.  Follow-up with provider for additional x-rays if wrist symptoms fail to improve    Follow-up:  Return to care if symptoms persist.

## 2024-07-13 ENCOUNTER — HEALTH MAINTENANCE LETTER (OUTPATIENT)
Age: 39
End: 2024-07-13

## 2025-03-18 ENCOUNTER — TELEPHONE (OUTPATIENT)
Dept: MAMMOGRAPHY | Facility: OTHER | Age: 40
End: 2025-03-18
Payer: COMMERCIAL

## 2025-03-18 NOTE — TELEPHONE ENCOUNTER
Patient concerned about mammogram with her saline implant.  We discussed how she can tell mammogram tech if compression feels too tight for her.  Discussed pumping or nursing before she comes.  We discussed taking tylenol or ibuprofen if breast is tender before her exam.

## 2025-04-07 ENCOUNTER — HOSPITAL ENCOUNTER (OUTPATIENT)
Dept: ULTRASOUND IMAGING | Facility: OTHER | Age: 40
Discharge: HOME | End: 2025-04-07
Attending: NURSE PRACTITIONER
Payer: COMMERCIAL

## 2025-04-07 ENCOUNTER — HOSPITAL ENCOUNTER (OUTPATIENT)
Dept: MAMMOGRAPHY | Facility: OTHER | Age: 40
Discharge: HOME | End: 2025-04-07
Attending: NURSE PRACTITIONER
Payer: COMMERCIAL

## 2025-04-07 DIAGNOSIS — T85.848A PAIN FROM BREAST IMPLANT, INITIAL ENCOUNTER: ICD-10-CM

## 2025-04-07 PROCEDURE — 77066 DX MAMMO INCL CAD BI: CPT

## 2025-04-07 PROCEDURE — G0279 TOMOSYNTHESIS, MAMMO: HCPCS

## 2025-04-07 PROCEDURE — 76642 ULTRASOUND BREAST LIMITED: CPT | Mod: LT

## 2025-04-11 ENCOUNTER — HOSPITAL ENCOUNTER (OUTPATIENT)
Dept: ULTRASOUND IMAGING | Facility: OTHER | Age: 40
Discharge: HOME | End: 2025-04-11
Attending: NURSE PRACTITIONER
Payer: COMMERCIAL

## 2025-04-11 ENCOUNTER — HOSPITAL ENCOUNTER (OUTPATIENT)
Dept: MAMMOGRAPHY | Facility: OTHER | Age: 40
Discharge: HOME | End: 2025-04-11
Attending: NURSE PRACTITIONER
Payer: COMMERCIAL

## 2025-04-11 DIAGNOSIS — R92.8 ABNORMAL FINDING ON BREAST IMAGING: ICD-10-CM

## 2025-04-11 PROCEDURE — 250N000009 HC RX 250: Performed by: RADIOLOGY

## 2025-04-11 PROCEDURE — 999N000065 MA POST PROCEDURE LEFT

## 2025-04-11 PROCEDURE — 250N000011 HC RX IP 250 OP 636: Performed by: RADIOLOGY

## 2025-04-11 PROCEDURE — A4648 IMPLANTABLE TISSUE MARKER: HCPCS

## 2025-04-11 RX ORDER — DEXTROAMPHETAMINE SACCHARATE, AMPHETAMINE ASPARTATE, DEXTROAMPHETAMINE SULFATE AND AMPHETAMINE SULFATE 2.5; 2.5; 2.5; 2.5 MG/1; MG/1; MG/1; MG/1
10 TABLET ORAL 3 TIMES DAILY
COMMUNITY
Start: 2025-03-10

## 2025-04-11 RX ORDER — LIDOCAINE HYDROCHLORIDE 10 MG/ML
20 INJECTION, SOLUTION INFILTRATION; PERINEURAL ONCE
Status: COMPLETED | OUTPATIENT
Start: 2025-04-11 | End: 2025-04-11

## 2025-04-11 RX ORDER — LIDOCAINE HYDROCHLORIDE AND EPINEPHRINE 10; 10 MG/ML; UG/ML
20 INJECTION, SOLUTION INFILTRATION; PERINEURAL ONCE
Status: COMPLETED | OUTPATIENT
Start: 2025-04-11 | End: 2025-04-11

## 2025-04-11 RX ADMIN — LIDOCAINE HYDROCHLORIDE 3 ML: 10 INJECTION, SOLUTION EPIDURAL; INFILTRATION; INTRACAUDAL; PERINEURAL at 11:04

## 2025-04-11 RX ADMIN — LIDOCAINE HYDROCHLORIDE AND EPINEPHRINE 5 ML: 10; 10 INJECTION, SOLUTION INFILTRATION; PERINEURAL at 11:11

## 2025-04-11 NOTE — PROGRESS NOTES
Patient here for ultrasound guided biopsy of left breast.  Procedure reviewed with patient by writer and radiologist, questions answered.  Time out performed prior to biopsy.  Biopsy completed by radiologist, clip placed.  Pressure held to biopsy site for 10 minutes.  Medipore dressing applied.   Post clip mammogram completed.  Sports bra and ice pack applied over dressing.  Discharge instructions reviewed with patient, patient verbalizes understanding of instructions.  Discharged to home in stable condition with no evidence of bleeding from biopsy site.   Yeni Serna RN.

## 2025-04-11 NOTE — DISCHARGE INSTRUCTIONS
After Your Breast Biopsy  Bleeding, bruising, and pain  Breast tenderness and some bruising is normal and may last several days. You may wear your bra overnight to support the breast.  You may use an ice pack for pain. Place it over the area for 15 to 20 minutes, several times a day.  You may take over-the-counter pain medicine:  On the day of the biopsy, we recommend Tylenol (acetaminophen) because it does not raise your risk of bleeding.  The next day, you may take an anti-inflammatory medicine (aspirin, ibuprofen, Motrin, Aleve, Advil), unless your doctor tells you not to.  Bandages and showering  Keep your bandage in place until tomorrow morning. Don't get it wet.  If you have small pieces of tape on the skin, leave them in place. They will fall off on their own, or you can remove them after 5 days.  You may shower the next morning after your biopsy.  Activity  No heavy activity (no running, no gym workouts, no lifting, no vacuuming, etc.) on the day of your biopsy.  You may go back to normal activity the next day. But limit what you do if you still have pain or discomfort.  Infection  Infection is rare. Signs of infection include:  Fever (including sweats and chills)  Redness  Pain that gets worse  Fluid draining from the biopsy site  Biopsy results  Results may take up to 5 business days.  A nurse or doctor from the [facility] will call with your results. We will also send the results to the doctor that ordered your biopsy.  If you have not gotten your results in 5 days, please call the Breast Center.  Call the Breast Center with questions or if:   You have bleeding that lasts more than 20 minutes.  You have pain that you can't control.  You have signs of infection (fever, sweats, chills, redness, increasing pain, or drainage).  After hours, please call the doctor who ordered your biopsy.  For informational purposes only. Not to replace the advice of your health care provider. Copyright   2010 [facility]. All rights reserved. Clinically reviewed by Jacque Lobato, Director, [facility]. [employer] [id number] - REV 08/23.

## 2025-04-14 LAB
PATH REPORT.COMMENTS IMP SPEC: NORMAL
PATH REPORT.FINAL DX SPEC: NORMAL
PHOTO IMAGE: NORMAL

## 2025-04-15 ENCOUNTER — OFFICE VISIT (OUTPATIENT)
Dept: SURGERY | Facility: OTHER | Age: 40
End: 2025-04-15
Attending: NURSE PRACTITIONER
Payer: COMMERCIAL

## 2025-04-15 VITALS
HEART RATE: 78 BPM | WEIGHT: 210 LBS | OXYGEN SATURATION: 97 % | BODY MASS INDEX: 31.1 KG/M2 | SYSTOLIC BLOOD PRESSURE: 120 MMHG | TEMPERATURE: 98.4 F | HEIGHT: 69 IN | RESPIRATION RATE: 14 BRPM | DIASTOLIC BLOOD PRESSURE: 80 MMHG

## 2025-04-15 DIAGNOSIS — D24.2 FIBROADENOMA OF BREAST, LEFT: ICD-10-CM

## 2025-04-15 DIAGNOSIS — N64.4 BREAST PAIN, LEFT: Primary | ICD-10-CM

## 2025-04-15 DIAGNOSIS — Z98.890 HISTORY OF AUGMENTATION OF LEFT BREAST: ICD-10-CM

## 2025-04-15 PROCEDURE — 99243 OFF/OP CNSLTJ NEW/EST LOW 30: CPT | Performed by: SURGERY

## 2025-04-15 RX ORDER — CALCIUM CARBONATE/VITAMIN D3 600MG-62.5
1 CAPSULE ORAL DAILY
COMMUNITY

## 2025-04-15 RX ORDER — LORATADINE 10 MG/1
10 CAPSULE, LIQUID FILLED ORAL DAILY PRN
COMMUNITY

## 2025-04-15 ASSESSMENT — PAIN SCALES - GENERAL: PAINLEVEL_OUTOF10: NO PAIN (0)

## 2025-04-15 NOTE — NURSING NOTE
"Chief Complaint   Patient presents with    Consult     Left breast       Initial /80 (BP Location: Right arm, Patient Position: Sitting, Cuff Size: Adult Large)   Pulse 78   Temp 98.4  F (36.9  C) (Tympanic)   Resp 14   Ht 1.74 m (5' 8.5\")   Wt 95.3 kg (210 lb)   LMP 04/01/2025   SpO2 97%   BMI 31.47 kg/m   Estimated body mass index is 31.47 kg/m  as calculated from the following:    Height as of this encounter: 1.74 m (5' 8.5\").    Weight as of this encounter: 95.3 kg (210 lb).  Medication Reconciliation: complete    What age did your menstrual cycle start? 13  Are you on or have you ever taken any hormone replacement or birth control? Birth control  How many children do you have? 1  How old were you when your first child was born? 37  Did you breast feed? yes  Do you have a family history of breast cancer? Paternal grandma - 90  Kathia Ahumada LPN..........4/15/2025  1:22 PM  "

## 2025-04-15 NOTE — PROGRESS NOTES
Primary Care Physician: Gisell Polanco NP    I was requested to see this patient in consultation by Gisell Polanco NP for evaluation of *** breast pain***. A copy of this note will be sent to Gisell Polanco NP.    HPI:   The patient is 39 year old female with pain in her*** breast. She has had this for ***. The pain is ***related to her menstrual cycle. She has no nipple changes or nipple drainage bilaterally. She has not noted any new skin lesions on the breasts. She hasn't noted any new lumps or bumps.   ***No family history of breast cancer.   The patient hasn't had biopsy performed.       CONSULTATION ASSESSMENT AND PLAN/RECOMMENDATIONS:   I discussed with the patient the pathophysiology of breast pain and breast disease. We specifically discussed that most pain is not related to breast cancer. We discussed options for treatment including warmth, NSAIDs, hormonal manipulation. I explained that good support is important in preventing breast pain. We discussed the option of performing a breast MRI. I recommended follow up with ***breast mammogram and US as well as office visit in 6 months. The patient's questions were answered.The patient expressed understanding and wishes to proceed with ***.    REVIEW OF SYSTEMS***  GENERAL: No fevers or chills. Denies fatigue, recent weight loss.  HEENT: No sinus drainage. No changes with vision orhearing. No difficulty swallowing.   LYMPHATICS:  No swollen nodes in axilla, neck or groin.  CARDIOVASCULAR: Denies chest pain, palpitations and dyspnea on exertion.  PULMONARY: No shortness of breath or cough. Noincrease in sputum production.  GI: Denies melena, bright red blood in stools. No hematemesis. No constipation or diarrhea.  : No dysuria or hematuria.  SKIN: No recent rashes or ulcers.   HEMATOLOGY:  No history of easy bruising or bleeding.  ENDOCRINE:  No history of diabetes or thyroid problems.  NEUROLOGY:  No history of seizures or headaches. No motor or sensory  changes.  BREAST: as above  Past Medical History:   Diagnosis Date    Attention deficit disorder with hyperactivity     No Comments Provided        Past Surgical History:   Procedure Laterality Date    MAMMOPLASTY AUGMENTATION      Augmentation mammaplasy w/ prosthesis       Current Outpatient Medications   Medication Sig Dispense Refill    albuterol (VENTOLIN HFA) 108 (90 Base) MCG/ACT inhaler Inhale 2 puffs into the lungs every 6 hours as needed for shortness of breath, wheezing or cough 8 g 0    amphetamine-dextroamphetamine (ADDERALL) 10 MG tablet Take 10 mg by mouth 3 times daily.      clindamycin (CLEOCIN-T) 1 % external gel Apply topically 2 times daily.      Calcium-Magnesium-Vitamin D 300- MG-MG-UNIT CHEW Take 1 tablet by mouth daily.      loratadine 10 MG capsule Take 10 mg by mouth daily as needed.      Probiotic Product (DAILY ULTIMATE PROBIOTIC-14) CAPS Take 1 capsule by mouth daily.       No current facility-administered medications for this visit.       Allergies   Allergen Reactions    Cats      Runny nose, sneezing    Food      Pt reports having  gluten and dairy sensitivities.  Chronic yeast infections    Penicillins     Latex Dermatitis       Family History   Problem Relation Age of Onset    Other - See Comments Brother         Psychiatric illness    Breast Cancer Paternal Grandmother        Social History     Socioeconomic History    Marital status:      Spouse name: None    Number of children: None    Years of education: None    Highest education level: None   Tobacco Use    Smoking status: Never    Smokeless tobacco: Never   Vaping Use    Vaping status: Never Used   Substance and Sexual Activity    Alcohol use: Not Currently    Drug use: Never   Social History Narrative    ,  is Matt Jacobsen.  Lives in [place] with her .      Attending college in [place] pursuing masters degree in engineering.  Hopes to move back to [place] after college    Working at  [employer]. Attending college in [place] pursuing mast                            ers degree in engineering.      Patient has never smoked.     Passive smoke exposure - yes    Alcohol Use - no    Drug Use - no    HIV/High Risk - no    Regular Exercise - yes    p 5/31/2013.     Social Drivers of Health     Financial Resource Strain: Low Risk  (12/7/2023)    [facility]    Overall Financial Resource Strain (CARDIA)     Difficulty of Paying Living Expenses: Not hard at all   Food Insecurity: No Food Insecurity (3/3/2025)    Received from [facility]    Hunger Vital Sign     Worried About Running Out of Food in the Last Year: Never true     Ran Out of Food in the Last Year: Never true   Transportation Needs: No Transportation Needs (3/3/2025)    Received from [facility]    PRAPARE - Transportation     Lack of Transportation (Medical): No     Lack of Transportation (Non-Medical): No   Interpersonal Safety: Low Risk  (4/15/2025)    Interpersonal Safety     Do you feel physically and emotionally safe where you currently live?: Yes     Within the past 12 months, have you been hit, slapped, kicked or otherwise physically hurt by someone?: No     Within the past 12 months, have you been humiliated or emotionally abused in other ways by your partner or ex-partner?: No   Housing Stability: Low Risk  (3/3/2025)    Received from [facility]    Housing Stability Vital Sign     Unable to Pay for Housing in the Last Year: No     Number of Times Moved in the Last Year: 0     Homeless in the Last Year: No     The above history was reviewed and updated today, 4/15/2025  PHYSICAL EXAM  Vitals: There were no vitals taken for this visit.  GENERAL: Healthy appearing patient in no acute distress. Pleasant and cooperative with exam and interview.    HEENT: Head-normocephalic. Eyes-no scleral icterus, pupils equal, round, andreactive to light. Nose-no nasal drainage. No lesions. Mouth-oral mucosa pink and moist, no lesions.  NECK: Supple. No thyroid nodules. Trachea midline.  LYMPHATICS:  No cervical, axillary or supraclavicularadenopathy.  CV: Regular rate and rhythm, no murmurs. No peripheral edema.  LUNGS:  No respiratory distress. Clear bilaterally to auscultation.  ABDOMEN: Non distended. Bowel sounds active. Soft, non-tender, nohepatosplenomegaly or hernias. No peritoneal signs.  SKIN: Pink, warm and dry. No jaundice. No rash.  NEURO:  Cranial nerves II-XII grossly intact. Alert and oriented.  PSYCH: Appropriate mood and affect.  BREAST: Breasts were examined in the seated and supine position. No mass noted bilaterally. No nipple changes or discharge bilaterally. ***tenderness noted.    IMAGING/LAB  I personally reviewed patient's recentmammogram and US images and*** reports.     bilaterally. No nipple changes or discharge bilaterally. Right breast mammoplasty scars well healed. Mild left breast/chest wall tenderness noted. Volume difference noted between right and left breast.     IMAGING/LAB  I personally reviewed patient's recent mammogram and US, and biopsy  images and  reports as well as pathology reports.

## 2025-04-21 NOTE — PATIENT INSTRUCTIONS
We discussed options for treatment including warmth, NSAIDs, and physical therapy evaluation.   We discussed the option of physical therapy consult for myofascial evaluation. Will order that and follow up with patient after to see if she is having improvement.  We discussed the option for breast MRI-now vs waiting until done lactating. If not improving with physical therapy will then consider MRI.   Call for concerns!

## 2025-05-19 ENCOUNTER — TELEPHONE (OUTPATIENT)
Dept: SURGERY | Facility: OTHER | Age: 40
End: 2025-05-19
Payer: COMMERCIAL

## 2025-05-19 NOTE — TELEPHONE ENCOUNTER
LMTCB X1 for patient to follow up with [facility] mid August to re-evaluate breast pain per Yeni Negrete on 5/19/2025 at 9:02 AM

## 2025-05-23 NOTE — TELEPHONE ENCOUNTER
Spoke with patient; She does not have her schedule at this time for August. She stated she will be calling us when she is able to schedule. Does not want a call back at this time.

## 2025-07-01 ENCOUNTER — THERAPY VISIT (OUTPATIENT)
Dept: PHYSICAL THERAPY | Facility: OTHER | Age: 40
End: 2025-07-01
Attending: SURGERY
Payer: COMMERCIAL

## 2025-07-01 DIAGNOSIS — Z98.890 HISTORY OF AUGMENTATION OF LEFT BREAST: ICD-10-CM

## 2025-07-01 DIAGNOSIS — N64.4 BREAST PAIN, LEFT: ICD-10-CM

## 2025-07-01 PROCEDURE — 97161 PT EVAL LOW COMPLEX 20 MIN: CPT | Mod: GP

## 2025-07-01 PROCEDURE — 97140 MANUAL THERAPY 1/> REGIONS: CPT | Mod: GP

## 2025-07-01 NOTE — PROGRESS NOTES
PHYSICAL THERAPY EVALUATION  Type of Visit: Evaluation       Fall Risk Screen:  Have you fallen 2 or more times in the past year?: No  Have you fallen and had an injury in the past year?: No    Subjective         Presenting condition or subjective complaint: Pain and tightness over an under the muscle implant on the left side    Patient referred to PT due to chronic and worsening left breast pain; has been an issue for about 5 years. Worse since delivery of child about 3 years ago. Pain occurs with movement, stretching. Will feel tightness, twinge/pinch of pain, occassionally an ache. 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 She had one implant exchange for a tissue expander many years ago. Patient is still breast feeding but mainly on right side, left side doesn't produce much milk. Also deals with mid back pain and is wondering if it is all related. Also concerned about her urinary stress incontinence. She plans to get a referral from her PCP to have PT for this as well. She wants to be able to play with child with out leakage. There is also discomfort with her  scar, more on the right side.   Date of onset: 26        Prior diagnostic imaging/testing results: Other Mamogram and ultrasound   Prior therapy history for the same diagnosis, illness or injury: No      Prior Level of Function  Transfers: Independent  Ambulation: Independent    Living Environment  Social support: With a significant other or spouse   Type of home: House   Stairs to enter the home: No       Ramp: No   Stairs inside the home: Yes 18 Is there a railing: Yes     Help at home: None  Equipment owned:       Employment: Yes   Hobbies/Interests: I have a busy toddler    Patient goals for therapy: Not have pain    Pain assessment: Pain present  Location: left breast, c section scar/Rating: does not rate 0-10 on VAS     Objective   CERVICAL SPINE EVALUATION  PAIN: Pain is  Exacerbated By: movements, stretching    POSTURE: level shoulder height    ROM: AROM WNL    MYOTOMES: WNL    NEURAL TENSION: Cervical WNL    PALPATION: postural loading limited shoulder L/R; general listening to left neck; local listening to pleural dome and left second rib; myofascial tightness with left costopleural ligament, clavipectoral fascia left, left breast again chest wall  SPINAL SEGMENTAL CONCLUSIONS: will continue to assess  Pelvic Floor Assessment: to be completed at later date    Assessment & Plan   CLINICAL IMPRESSIONS  Medical Diagnosis: N64.4 (ICD-10-CM) - Breast pain, left  Z98.890 (ICD-10-CM) - History of augmentation of left breast    Treatment Diagnosis: left breast pain, myofascial tightness and pain, muscle weakness, urinary incontinence   Impression/Assessment: Patient is a 39 year old female with left breast pain, urinary stress incontinence and pain with  scar complaints.  The following significant findings have been identified: Pain, Decreased ROM/flexibility, Decreased joint mobility, Decreased strength, Impaired muscle performance, Decreased activity tolerance, and Impaired posture. These impairments interfere with their ability to perform self care tasks, work tasks, recreational activities, household chores, household mobility, and community mobility as compared to previous level of function.     Clinical Decision Making (Complexity):  Clinical Presentation: Evolving/Changing  Clinical Presentation Rationale: based on medical and personal factors listed in PT evaluation  Clinical Decision Making (Complexity): Low complexity    PLAN OF CARE  Treatment Interventions:  Modalities: Biofeedback, Cryotherapy, Hot Pack  Interventions: Manual Therapy, Neuromuscular Re-education, Therapeutic Activity, Therapeutic Exercise, Self-Care/Home Management    Long Term Goals     PT Goal 1  Goal Identifier: left breast pain  Goal Description: Patient to complete movements and stretching  without limitation from left breast pain  Target Date: 25  PT Goal 2  Goal Identifier: mid back pain  Goal Description: Patient to complete daily functional tasks such as caring for child, lifting objects without limitation from mid back pain.  Target Date: 25  PT Goal 3  Goal Identifier: incontinence  Goal Description: Patietn to have 5/5 MMT for pelvic floor musculature to allow play with child without leakage.  Target Date: 25  PT Goal 4  Goal Identifier: scar pain  Goal Description: Patient to report reduced pain with  scar by 75%.  Target Date: 25      Frequency of Treatment: 1-2 times per week, as needed  Duration of Treatment: 12 weeks    Recommended Referrals to Other Professionals: NA  Education Assessment:   Learner/Method: No Barriers to Learning;Patient    Risks and benefits of evaluation/treatment have been explained.   Patient/Family/caregiver agrees with Plan of Care.     Evaluation Time:     PT Eval, Low Complexity Minutes (55281): 15       Signing Clinician: Racquel Pop PT

## 2025-07-10 ENCOUNTER — THERAPY VISIT (OUTPATIENT)
Dept: PHYSICAL THERAPY | Facility: OTHER | Age: 40
End: 2025-07-10
Attending: SURGERY
Payer: COMMERCIAL

## 2025-07-10 DIAGNOSIS — R10.2 PELVIC PAIN IN FEMALE: Primary | ICD-10-CM

## 2025-07-10 PROCEDURE — 97140 MANUAL THERAPY 1/> REGIONS: CPT | Mod: GP

## 2025-07-10 PROCEDURE — 97110 THERAPEUTIC EXERCISES: CPT | Mod: GP

## 2025-07-15 ENCOUNTER — THERAPY VISIT (OUTPATIENT)
Dept: PHYSICAL THERAPY | Facility: OTHER | Age: 40
End: 2025-07-15
Attending: SURGERY
Payer: COMMERCIAL

## 2025-07-15 DIAGNOSIS — Z98.890 HISTORY OF AUGMENTATION OF LEFT BREAST: ICD-10-CM

## 2025-07-15 DIAGNOSIS — N64.4 BREAST PAIN: Primary | ICD-10-CM

## 2025-07-15 PROCEDURE — 97140 MANUAL THERAPY 1/> REGIONS: CPT | Mod: GP

## 2025-07-19 ENCOUNTER — HEALTH MAINTENANCE LETTER (OUTPATIENT)
Age: 40
End: 2025-07-19

## 2025-07-22 ENCOUNTER — THERAPY VISIT (OUTPATIENT)
Dept: PHYSICAL THERAPY | Facility: OTHER | Age: 40
End: 2025-07-22
Attending: SURGERY
Payer: COMMERCIAL

## 2025-07-22 DIAGNOSIS — M54.42 LEFT-SIDED LOW BACK PAIN WITH LEFT-SIDED SCIATICA: ICD-10-CM

## 2025-07-22 PROCEDURE — 97140 MANUAL THERAPY 1/> REGIONS: CPT | Mod: GP

## 2025-07-28 ENCOUNTER — THERAPY VISIT (OUTPATIENT)
Dept: CHIROPRACTIC MEDICINE | Facility: OTHER | Age: 40
End: 2025-07-28
Attending: CHIROPRACTOR
Payer: COMMERCIAL

## 2025-07-28 VITALS — OXYGEN SATURATION: 99 % | TEMPERATURE: 97.6 F | HEART RATE: 68 BPM | RESPIRATION RATE: 16 BRPM

## 2025-07-28 DIAGNOSIS — M99.01 SEGMENTAL AND SOMATIC DYSFUNCTION OF CERVICAL REGION: Primary | ICD-10-CM

## 2025-07-28 DIAGNOSIS — M99.02 SEGMENTAL AND SOMATIC DYSFUNCTION OF THORACIC REGION: ICD-10-CM

## 2025-07-28 DIAGNOSIS — M54.2 DORSALGIA OF CERVICAL REGION: ICD-10-CM

## 2025-07-28 DIAGNOSIS — M99.04 SEGMENTAL AND SOMATIC DYSFUNCTION OF SACRAL REGION: ICD-10-CM

## 2025-07-28 NOTE — PROGRESS NOTES
Bilateral back is constantly tight. 3/10. Radiating to bilateral neck.   Elsa Mahi on 7/28/2025 at 8:30 AM    Reviewed by EW    Visit #:  2    Subjective:  Jay Jay Jacobsen is a 40 year old female who is seen in f/u up for:        Segmental and somatic dysfunction of cervical region  Segmental and somatic dysfunction of thoracic region  Dorsalgia of cervical region  Segmental and somatic dysfunction of sacral region.     Since last visit on 7/10/2025,  Jay Jay Jacobsen reports: Symptoms seem to do well after our last encounter.  Continue to work with physical therapy and working on segmental yoga practices.  While this seems to be helpful this does seem to be causing some increased levels of pain.      (DVPRS) Pain Rating Score : (not recorded)     Objective:  The following was observed:  Pulse 68   Temp 97.6  F (36.4  C) (Tympanic)   Resp 16   SpO2 99%      P: palpatory tendernessC7, T3, T12, left PSIS:    A: static palpation demonstrates intersegmental asymmetry , cervical, thoracic, sacrum  R: motion palpation notes restricted motion, C7 , T3 , T12 , and Sacrum   T: muscle spasm at level(s): Quadratus lumborum primarily on the left, upper trapezius primarily on the left:      Segmental spinal dysfunction/restrictions found at:  :  C7 Left lateral flexion restricted and Extension restriction  T3 Extension restriction  T12 Left lateral flexion restricted and Extension restriction  Sacrum Left lateral flexion restricted and Extension restriction.      Assessment: Continuation of symptoms seen on her last encounter.  Continue to suspect following up with patient on roughly biweekly basis.  Provided patient another treatment option outside of the clinic with dynamic body balancing.    Diagnoses:      1. Segmental and somatic dysfunction of cervical region    2. Segmental and somatic dysfunction of thoracic region    3. Dorsalgia of cervical region    4. Segmental and somatic dysfunction of sacral  region        Patient's condition:  Patient had restrictions pre-manipulation    Treatment effectiveness:  Post manipulation there is better intersegmental movement      Procedures:  CMT:  70060 Chiropractic manipulative treatment 3-4 regions performed   Cervical: Gentle diversified, C7 , Supine  Thoracic: Diversified, T3, T12, Prone  Pelvis: Drop Table, Sacrum , Prone    Modalities:  None performed this visit    Therapeutic procedures:  None  Deferred to PT    Response to Treatment  Improved intersegmental motion    Prognosis: Good    Progress towards Goals: Patient is making progress towards the goal.     Recommendations:    Instructions:monitor symptoms    Follow-up:  Return to care if symptoms persist.

## 2025-07-29 ENCOUNTER — THERAPY VISIT (OUTPATIENT)
Dept: PHYSICAL THERAPY | Facility: OTHER | Age: 40
End: 2025-07-29
Attending: SURGERY
Payer: COMMERCIAL

## 2025-07-29 DIAGNOSIS — Z98.890 HISTORY OF AUGMENTATION OF LEFT BREAST: ICD-10-CM

## 2025-07-29 DIAGNOSIS — N64.4 BREAST PAIN: ICD-10-CM

## 2025-07-29 DIAGNOSIS — R10.2 PELVIC PAIN IN FEMALE: Primary | ICD-10-CM

## 2025-07-29 PROCEDURE — 97140 MANUAL THERAPY 1/> REGIONS: CPT | Mod: GP

## 2025-07-29 PROCEDURE — 97110 THERAPEUTIC EXERCISES: CPT | Mod: GP

## 2025-08-07 ENCOUNTER — THERAPY VISIT (OUTPATIENT)
Dept: CHIROPRACTIC MEDICINE | Facility: OTHER | Age: 40
End: 2025-08-07
Attending: CHIROPRACTOR
Payer: COMMERCIAL

## 2025-08-07 VITALS — OXYGEN SATURATION: 97 % | HEART RATE: 75 BPM | RESPIRATION RATE: 16 BRPM | TEMPERATURE: 97.9 F

## 2025-08-07 DIAGNOSIS — M99.04 SEGMENTAL AND SOMATIC DYSFUNCTION OF SACRAL REGION: ICD-10-CM

## 2025-08-07 DIAGNOSIS — M99.01 SEGMENTAL AND SOMATIC DYSFUNCTION OF CERVICAL REGION: Primary | ICD-10-CM

## 2025-08-07 DIAGNOSIS — M99.02 SEGMENTAL AND SOMATIC DYSFUNCTION OF THORACIC REGION: ICD-10-CM

## 2025-08-07 DIAGNOSIS — M54.2 DORSALGIA OF CERVICAL REGION: ICD-10-CM

## 2025-08-14 ENCOUNTER — THERAPY VISIT (OUTPATIENT)
Dept: PHYSICAL THERAPY | Facility: OTHER | Age: 40
End: 2025-08-14
Attending: SURGERY
Payer: COMMERCIAL

## 2025-08-14 DIAGNOSIS — R10.2 PELVIC PAIN IN FEMALE: Primary | ICD-10-CM

## 2025-08-14 PROCEDURE — 97110 THERAPEUTIC EXERCISES: CPT | Mod: GP

## (undated) RX ORDER — METHYLERGONOVINE MALEATE 0.2 MG/1
TABLET ORAL
Status: DISPENSED
Start: 2022-06-03

## (undated) RX ORDER — IPRATROPIUM BROMIDE AND ALBUTEROL SULFATE 2.5; .5 MG/3ML; MG/3ML
SOLUTION RESPIRATORY (INHALATION)
Status: DISPENSED
Start: 2024-02-04